# Patient Record
Sex: FEMALE | Race: BLACK OR AFRICAN AMERICAN | NOT HISPANIC OR LATINO | URBAN - METROPOLITAN AREA
[De-identification: names, ages, dates, MRNs, and addresses within clinical notes are randomized per-mention and may not be internally consistent; named-entity substitution may affect disease eponyms.]

---

## 2018-11-17 ENCOUNTER — HOSPITAL ENCOUNTER (OUTPATIENT)
Dept: MRI IMAGING | Facility: HOSPITAL | Age: 38
Discharge: HOME | End: 2018-11-17
Attending: ORTHOPAEDIC SURGERY | Admitting: ORTHOPAEDIC SURGERY

## 2023-06-15 ENCOUNTER — PREP FOR SURGERY (OUTPATIENT)
Dept: OTHER | Facility: HOSPITAL | Age: 43
End: 2023-06-15
Payer: MEDICAID

## 2023-06-15 ENCOUNTER — OFFICE VISIT (OUTPATIENT)
Dept: SURGERY | Facility: CLINIC | Age: 43
End: 2023-06-15
Payer: MEDICAID

## 2023-06-15 VITALS
OXYGEN SATURATION: 100 % | HEIGHT: 64 IN | DIASTOLIC BLOOD PRESSURE: 78 MMHG | TEMPERATURE: 98.2 F | BODY MASS INDEX: 22.31 KG/M2 | SYSTOLIC BLOOD PRESSURE: 116 MMHG | HEART RATE: 74 BPM

## 2023-06-15 DIAGNOSIS — L05.91 PILONIDAL CYST: Primary | ICD-10-CM

## 2023-06-15 RX ORDER — AMLODIPINE BESYLATE 5 MG/1
5 TABLET ORAL DAILY
COMMUNITY

## 2023-06-15 RX ORDER — BACLOFEN 10 MG/1
TABLET ORAL
COMMUNITY
Start: 2023-06-01

## 2023-06-15 RX ORDER — IBUPROFEN 600 MG/1
TABLET ORAL
COMMUNITY
Start: 2023-06-01

## 2023-06-15 RX ORDER — LISINOPRIL AND HYDROCHLOROTHIAZIDE 25; 20 MG/1; MG/1
1 TABLET ORAL DAILY
COMMUNITY

## 2023-06-15 NOTE — PROGRESS NOTES
"Chief Complaint  New Patient (Pilonidal Cyst)    Subjective        Kathi Mason presents to [facility] GENERAL SURGERY  History of Present Illness    The patient is a 42-year-old female with a history of cerebral palsy. She also has a history of hidradenitis of bilateral axilla with excision and skin grafting when she was 14 years old. She has no other significant past medical or surgical history.     She presents today to discuss her pilonidal cyst. She has had this for many years, however, over the past couple of years, it has worsened. The cyst was incised and drained at the office of her primary care clinician, and she states that the cyst returned almost immediately. The cyst causes her pain daily. She spends much of her time sitting in her wheelchair which puts added pressure on this area. She denies fevers, chills, or drainage. She denies prior surgical intervention for this issue.     Objective   Vital Signs:  /78 (Cuff Size: Adult)   Pulse 74   Temp 98.2 °F (36.8 °C) (Infrared)   Ht 162.6 cm (64\")   SpO2 100%   BMI 22.31 kg/m²   Estimated body mass index is 22.31 kg/m² as calculated from the following:    Height as of this encounter: 162.6 cm (64\").    Weight as of 2/6/19: 59 kg (130 lb).             Physical Exam     Constitutional:       General: She is not in acute distress.     Appearance: Normal appearance. She is not ill-appearing.   HENT:      Head: Normocephalic and atraumatic.      Right Ear: External ear normal.      Left Ear: External ear normal.   Eyes:      Extraocular Movements: Extraocular movements intact.      Conjunctiva/sclera: Conjunctivae normal.   Cardiovascular:      Rate and Rhythm: Normal rate and regular rhythm.   Pulmonary:      Effort: Pulmonary effort is normal. No respiratory distress.   Abdominal:      General: There is no distension.      Palpations: Abdomen is soft.      Tenderness: There is no abdominal tenderness.   Musculoskeletal:         " General: No swelling or deformity.   Skin:     General: Skin is warm and dry.      Comment: Multiple draining pits over the sacrum. No evidence of infection.  Neurological:      Mental Status: She is alert and oriented to person, place, and time. Mental status is at baseline.    Result Review :                   Assessment and Plan   There are no diagnoses linked to this encounter.        Pilonidal cyst:  The patient is a 42-year-old female with history of cerebral palsy and hidradenitis. She now has a pilonidal cyst causing her pain. The plan is for a pilonidal cystectomy in the operating room. I discussed risks, benefits, and alternatives to pilonidal cyst excision including infection, bleeding, and injury to surrounding structures, and the patient elects to proceed.       Follow Up   No follow-ups on file.  Patient was given instructions and counseling regarding her condition or for health maintenance advice. Please see specific information pulled into the AVS if appropriate.     Transcribed from ambient dictation for Gómez Espino MD by Jie Washington.  06/15/23   12:27 EDT    Patient or patient representative verbalized consent to the visit recording.  I have personally performed the services described in this document as transcribed by the above individual, and it is both accurate and complete.

## 2023-07-27 ENCOUNTER — OFFICE VISIT (OUTPATIENT)
Dept: SURGERY | Facility: CLINIC | Age: 43
End: 2023-07-27
Payer: MEDICAID

## 2023-07-27 VITALS
DIASTOLIC BLOOD PRESSURE: 97 MMHG | HEIGHT: 64 IN | SYSTOLIC BLOOD PRESSURE: 146 MMHG | OXYGEN SATURATION: 100 % | HEART RATE: 78 BPM | TEMPERATURE: 99.1 F | BODY MASS INDEX: 21.87 KG/M2

## 2023-07-27 DIAGNOSIS — L05.91 PILONIDAL CYST: Primary | ICD-10-CM

## 2023-07-27 PROCEDURE — 1160F RVW MEDS BY RX/DR IN RCRD: CPT | Performed by: STUDENT IN AN ORGANIZED HEALTH CARE EDUCATION/TRAINING PROGRAM

## 2023-07-27 PROCEDURE — 1159F MED LIST DOCD IN RCRD: CPT | Performed by: STUDENT IN AN ORGANIZED HEALTH CARE EDUCATION/TRAINING PROGRAM

## 2023-07-27 PROCEDURE — 99024 POSTOP FOLLOW-UP VISIT: CPT | Performed by: STUDENT IN AN ORGANIZED HEALTH CARE EDUCATION/TRAINING PROGRAM

## 2023-08-19 NOTE — PROGRESS NOTES
"Chief Complaint  Post-op (PO Pilonidal Cystectomy 7/14/23)    Subjective        Kathi Mason presents to [facility] GENERAL SURGERY  History of Present Illness    43-year-old lady here for follow-up after her pilonidal cystectomy.  She is doing well.  I removed her sutures and drain today.  Follow-up in 1 month to discuss progress.    Objective   Vital Signs:  /97 (Cuff Size: Adult) Comment: Patient in pain  Pulse 78   Temp 99.1 øF (37.3 øC) (Infrared)   Ht 162.6 cm (64\")   SpO2 100%   BMI 21.87 kg/mý   Estimated body mass index is 21.87 kg/mý as calculated from the following:    Height as of this encounter: 162.6 cm (64\").    Weight as of 7/14/23: 57.8 kg (127 lb 6.4 oz).       BMI is within normal parameters. No other follow-up for BMI required.      Physical Exam   Result Review :                   Assessment and Plan   Diagnoses and all orders for this visit:    1. Pilonidal cyst (Primary)        43-year-old lady here for follow-up after her pilonidal cystectomy.  She is doing well.  I removed her sutures and drain today.  Follow-up in 1 month to discuss progress.         Follow Up   No follow-ups on file.  Patient was given instructions and counseling regarding her condition or for health maintenance advice. Please see specific information pulled into the AVS if appropriate.         "

## 2023-08-29 ENCOUNTER — OFFICE VISIT (OUTPATIENT)
Dept: SURGERY | Facility: CLINIC | Age: 43
End: 2023-08-29
Payer: MEDICAID

## 2023-08-29 VITALS
DIASTOLIC BLOOD PRESSURE: 85 MMHG | TEMPERATURE: 98.7 F | BODY MASS INDEX: 21.87 KG/M2 | HEIGHT: 64 IN | HEART RATE: 92 BPM | OXYGEN SATURATION: 100 % | SYSTOLIC BLOOD PRESSURE: 119 MMHG

## 2023-08-29 DIAGNOSIS — L05.91 PILONIDAL CYST: Primary | ICD-10-CM

## 2023-08-29 PROCEDURE — 99024 POSTOP FOLLOW-UP VISIT: CPT | Performed by: STUDENT IN AN ORGANIZED HEALTH CARE EDUCATION/TRAINING PROGRAM

## 2023-08-29 PROCEDURE — 1159F MED LIST DOCD IN RCRD: CPT | Performed by: STUDENT IN AN ORGANIZED HEALTH CARE EDUCATION/TRAINING PROGRAM

## 2023-08-29 PROCEDURE — 1160F RVW MEDS BY RX/DR IN RCRD: CPT | Performed by: STUDENT IN AN ORGANIZED HEALTH CARE EDUCATION/TRAINING PROGRAM

## 2023-08-29 NOTE — PROGRESS NOTES
"Chief Complaint  Post-op (1 mos fu PO Pilonidal Cystectomy 7/14/23)    Subjective        Kathi Mason presents to [facility] GENERAL SURGERY  History of Present Illness    43-year-old lady here for follow-up after her pilonidal cystectomy.  Still having slight amount of pain with direct pressure to the area however no significant pain.  No fever or chills, no drainage, no open wound.  Incision looks great, no further intervention is needed.  We discussed to offload pressure from the area is much as possible, this is difficult given her cerebral palsy.  No further restrictions my standpoint she can follow-up being in the future if needed.    Objective   Vital Signs:  /85 (Cuff Size: Adult)   Pulse 92   Temp 98.7 øF (37.1 øC) (Infrared)   Ht 162.6 cm (64\")   SpO2 100%   BMI 21.87 kg/mý   Estimated body mass index is 21.87 kg/mý as calculated from the following:    Height as of this encounter: 162.6 cm (64\").    Weight as of 7/14/23: 57.8 kg (127 lb 6.4 oz).       BMI is within normal parameters. No other follow-up for BMI required.      Physical Exam  Constitutional:       General: She is not in acute distress.     Appearance: Normal appearance. She is not ill-appearing.   HENT:      Head: Normocephalic and atraumatic.      Right Ear: External ear normal.      Left Ear: External ear normal.   Eyes:      Extraocular Movements: Extraocular movements intact.      Conjunctiva/sclera: Conjunctivae normal.   Cardiovascular:      Rate and Rhythm: Normal rate and regular rhythm.   Pulmonary:      Effort: Pulmonary effort is normal. No respiratory distress.   Abdominal:      General: There is no distension.      Palpations: Abdomen is soft.      Tenderness: There is no abdominal tenderness.   Musculoskeletal:         General: No swelling or deformity.   Skin:     General: Skin is warm and dry.   Neurological:      Mental Status: She is alert and oriented to person, place, and time. Mental status " is at baseline.      Result Review :                   Assessment and Plan   Diagnoses and all orders for this visit:    1. Pilonidal cyst (Primary)      43-year-old lady here for follow-up after her pilonidal cystectomy.  Still having slight amount of pain with direct pressure to the area however no significant pain.  No fever or chills, no drainage, no open wound.  Incision looks great, no further intervention is needed.  We discussed to offload pressure from the area is much as possible, this is difficult given her cerebral palsy.  No further restrictions my standpoint she can follow-up being in the future if needed.         Follow Up   No follow-ups on file.  Patient was given instructions and counseling regarding her condition or for health maintenance advice. Please see specific information pulled into the AVS if appropriate.

## 2024-02-12 RX ORDER — SULFAMETHOXAZOLE AND TRIMETHOPRIM 800; 160 MG/1; MG/1
1 TABLET ORAL 2 TIMES DAILY
Qty: 20 TABLET | Refills: 0 | Status: SHIPPED | OUTPATIENT
Start: 2024-02-12

## 2024-02-22 ENCOUNTER — TELEPHONE (OUTPATIENT)
Dept: SURGERY | Facility: CLINIC | Age: 44
End: 2024-02-22

## 2024-02-22 ENCOUNTER — OFFICE VISIT (OUTPATIENT)
Dept: SURGERY | Facility: CLINIC | Age: 44
End: 2024-02-22
Payer: MEDICAID

## 2024-02-22 VITALS
HEART RATE: 86 BPM | OXYGEN SATURATION: 99 % | SYSTOLIC BLOOD PRESSURE: 131 MMHG | BODY MASS INDEX: 21.87 KG/M2 | TEMPERATURE: 98.4 F | HEIGHT: 64 IN | DIASTOLIC BLOOD PRESSURE: 94 MMHG

## 2024-02-22 DIAGNOSIS — L05.91 PILONIDAL CYST: Primary | ICD-10-CM

## 2024-02-22 RX ORDER — SODIUM CHLORIDE 9 MG/ML
40 INJECTION, SOLUTION INTRAVENOUS AS NEEDED
OUTPATIENT
Start: 2024-02-22

## 2024-02-22 RX ORDER — SODIUM CHLORIDE 0.9 % (FLUSH) 0.9 %
3 SYRINGE (ML) INJECTION EVERY 12 HOURS SCHEDULED
OUTPATIENT
Start: 2024-02-22

## 2024-02-22 RX ORDER — SODIUM CHLORIDE 0.9 % (FLUSH) 0.9 %
3-10 SYRINGE (ML) INJECTION AS NEEDED
OUTPATIENT
Start: 2024-02-22

## 2024-02-22 NOTE — TELEPHONE ENCOUNTER
02/22/24 Per [facility] Ted alex.  No auth required for CPT 03189 done I&D Pilondial Abscess done IOP on 02/22/24.

## 2024-02-23 ENCOUNTER — LAB (OUTPATIENT)
Dept: LAB | Facility: HOSPITAL | Age: 44
End: 2024-02-23
Payer: MEDICAID

## 2024-02-23 LAB
ANION GAP SERPL CALCULATED.3IONS-SCNC: 10 MMOL/L (ref 5–15)
BASOPHILS # BLD AUTO: 0.07 10*3/MM3 (ref 0–0.2)
BASOPHILS NFR BLD AUTO: 1.2 % (ref 0–1.5)
BUN SERPL-MCNC: 10 MG/DL (ref 6–20)
BUN/CREAT SERPL: 18.2 (ref 7–25)
CALCIUM SPEC-SCNC: 9 MG/DL (ref 8.6–10.5)
CHLORIDE SERPL-SCNC: 103 MMOL/L (ref 98–107)
CO2 SERPL-SCNC: 26 MMOL/L (ref 22–29)
CREAT SERPL-MCNC: 0.55 MG/DL (ref 0.57–1)
DEPRECATED RDW RBC AUTO: 42.9 FL (ref 37–54)
EGFRCR SERPLBLD CKD-EPI 2021: 118.3 ML/MIN/1.73
EOSINOPHIL # BLD AUTO: 0.15 10*3/MM3 (ref 0–0.4)
EOSINOPHIL NFR BLD AUTO: 2.6 % (ref 0.3–6.2)
ERYTHROCYTE [DISTWIDTH] IN BLOOD BY AUTOMATED COUNT: 12.8 % (ref 12.3–15.4)
GLUCOSE SERPL-MCNC: 71 MG/DL (ref 65–99)
HCT VFR BLD AUTO: 37.2 % (ref 34–46.6)
HGB BLD-MCNC: 12.6 G/DL (ref 12–15.9)
IMM GRANULOCYTES # BLD AUTO: 0.02 10*3/MM3 (ref 0–0.05)
IMM GRANULOCYTES NFR BLD AUTO: 0.3 % (ref 0–0.5)
LYMPHOCYTES # BLD AUTO: 1.76 10*3/MM3 (ref 0.7–3.1)
LYMPHOCYTES NFR BLD AUTO: 30.6 % (ref 19.6–45.3)
MCH RBC QN AUTO: 31.7 PG (ref 26.6–33)
MCHC RBC AUTO-ENTMCNC: 33.9 G/DL (ref 31.5–35.7)
MCV RBC AUTO: 93.5 FL (ref 79–97)
MONOCYTES # BLD AUTO: 0.47 10*3/MM3 (ref 0.1–0.9)
MONOCYTES NFR BLD AUTO: 8.2 % (ref 5–12)
NEUTROPHILS NFR BLD AUTO: 3.29 10*3/MM3 (ref 1.7–7)
NEUTROPHILS NFR BLD AUTO: 57.1 % (ref 42.7–76)
NRBC BLD AUTO-RTO: 0 /100 WBC (ref 0–0.2)
PLATELET # BLD AUTO: 412 10*3/MM3 (ref 140–450)
PMV BLD AUTO: 9.5 FL (ref 6–12)
POTASSIUM SERPL-SCNC: 3.4 MMOL/L (ref 3.5–5.2)
RBC # BLD AUTO: 3.98 10*6/MM3 (ref 3.77–5.28)
SODIUM SERPL-SCNC: 139 MMOL/L (ref 136–145)
WBC NRBC COR # BLD AUTO: 5.76 10*3/MM3 (ref 3.4–10.8)

## 2024-02-23 PROCEDURE — 80048 BASIC METABOLIC PNL TOTAL CA: CPT | Performed by: STUDENT IN AN ORGANIZED HEALTH CARE EDUCATION/TRAINING PROGRAM

## 2024-02-23 PROCEDURE — 36415 COLL VENOUS BLD VENIPUNCTURE: CPT | Performed by: STUDENT IN AN ORGANIZED HEALTH CARE EDUCATION/TRAINING PROGRAM

## 2024-02-23 PROCEDURE — 85025 COMPLETE CBC W/AUTO DIFF WBC: CPT | Performed by: STUDENT IN AN ORGANIZED HEALTH CARE EDUCATION/TRAINING PROGRAM

## 2024-02-27 ENCOUNTER — ANESTHESIA (OUTPATIENT)
Dept: PERIOP | Facility: HOSPITAL | Age: 44
End: 2024-02-27
Payer: MEDICAID

## 2024-02-27 ENCOUNTER — ANESTHESIA EVENT (OUTPATIENT)
Dept: PERIOP | Facility: HOSPITAL | Age: 44
End: 2024-02-27
Payer: MEDICAID

## 2024-02-27 ENCOUNTER — HOSPITAL ENCOUNTER (OUTPATIENT)
Facility: HOSPITAL | Age: 44
Setting detail: HOSPITAL OUTPATIENT SURGERY
Discharge: HOME | End: 2024-02-27
Attending: STUDENT IN AN ORGANIZED HEALTH CARE EDUCATION/TRAINING PROGRAM | Admitting: STUDENT IN AN ORGANIZED HEALTH CARE EDUCATION/TRAINING PROGRAM
Payer: MEDICAID

## 2024-02-27 VITALS
HEART RATE: 70 BPM | DIASTOLIC BLOOD PRESSURE: 110 MMHG | TEMPERATURE: 97.5 F | HEIGHT: 64 IN | OXYGEN SATURATION: 100 % | BODY MASS INDEX: 22.36 KG/M2 | RESPIRATION RATE: 15 BRPM | WEIGHT: 131 LBS | SYSTOLIC BLOOD PRESSURE: 143 MMHG

## 2024-02-27 DIAGNOSIS — L05.91 PILONIDAL CYST: ICD-10-CM

## 2024-02-27 LAB — B-HCG UR QL: NEGATIVE

## 2024-02-27 PROCEDURE — 25010000002 DEXAMETHASONE PER 1 MG: Performed by: NURSE ANESTHETIST, CERTIFIED REGISTERED

## 2024-02-27 PROCEDURE — 25010000002 FENTANYL CITRATE (PF) 50 MCG/ML SOLUTION: Performed by: NURSE ANESTHETIST, CERTIFIED REGISTERED

## 2024-02-27 PROCEDURE — 25010000002 MAGNESIUM SULFATE PER 500 MG OF MAGNESIUM: Performed by: NURSE ANESTHETIST, CERTIFIED REGISTERED

## 2024-02-27 PROCEDURE — 25010000002 SUGAMMADEX 200 MG/2ML SOLUTION: Performed by: NURSE ANESTHETIST, CERTIFIED REGISTERED

## 2024-02-27 PROCEDURE — 25810000003 LACTATED RINGERS PER 1000 ML: Performed by: STUDENT IN AN ORGANIZED HEALTH CARE EDUCATION/TRAINING PROGRAM

## 2024-02-27 PROCEDURE — 25010000002 HYDROMORPHONE 1 MG/ML SOLUTION: Performed by: NURSE ANESTHETIST, CERTIFIED REGISTERED

## 2024-02-27 PROCEDURE — 25010000002 PROPOFOL 10 MG/ML EMULSION: Performed by: NURSE ANESTHETIST, CERTIFIED REGISTERED

## 2024-02-27 PROCEDURE — 81025 URINE PREGNANCY TEST: CPT | Performed by: STUDENT IN AN ORGANIZED HEALTH CARE EDUCATION/TRAINING PROGRAM

## 2024-02-27 PROCEDURE — 88304 TISSUE EXAM BY PATHOLOGIST: CPT | Performed by: STUDENT IN AN ORGANIZED HEALTH CARE EDUCATION/TRAINING PROGRAM

## 2024-02-27 PROCEDURE — 25010000002 MIDAZOLAM PER 1 MG: Performed by: NURSE ANESTHETIST, CERTIFIED REGISTERED

## 2024-02-27 PROCEDURE — 25010000002 ONDANSETRON PER 1 MG: Performed by: NURSE ANESTHETIST, CERTIFIED REGISTERED

## 2024-02-27 PROCEDURE — 25010000002 LABETALOL 5 MG/ML SOLUTION: Performed by: NURSE ANESTHETIST, CERTIFIED REGISTERED

## 2024-02-27 RX ORDER — ROCURONIUM BROMIDE 10 MG/ML
INJECTION, SOLUTION INTRAVENOUS AS NEEDED
Status: DISCONTINUED | OUTPATIENT
Start: 2024-02-27 | End: 2024-02-27 | Stop reason: SURG

## 2024-02-27 RX ORDER — DIPHENHYDRAMINE HYDROCHLORIDE 50 MG/ML
12.5 INJECTION INTRAMUSCULAR; INTRAVENOUS ONCE AS NEEDED
Status: DISCONTINUED | OUTPATIENT
Start: 2024-02-27 | End: 2024-02-27 | Stop reason: HOSPADM

## 2024-02-27 RX ORDER — MIDAZOLAM HYDROCHLORIDE 1 MG/ML
INJECTION INTRAMUSCULAR; INTRAVENOUS AS NEEDED
Status: DISCONTINUED | OUTPATIENT
Start: 2024-02-27 | End: 2024-02-27 | Stop reason: SURG

## 2024-02-27 RX ORDER — IPRATROPIUM BROMIDE AND ALBUTEROL SULFATE 2.5; .5 MG/3ML; MG/3ML
3 SOLUTION RESPIRATORY (INHALATION) ONCE AS NEEDED
Status: DISCONTINUED | OUTPATIENT
Start: 2024-02-27 | End: 2024-02-27 | Stop reason: HOSPADM

## 2024-02-27 RX ORDER — MAGNESIUM SULFATE HEPTAHYDRATE 500 MG/ML
INJECTION, SOLUTION INTRAMUSCULAR; INTRAVENOUS AS NEEDED
Status: DISCONTINUED | OUTPATIENT
Start: 2024-02-27 | End: 2024-02-27 | Stop reason: SURG

## 2024-02-27 RX ORDER — SODIUM CHLORIDE 0.9 % (FLUSH) 0.9 %
3 SYRINGE (ML) INJECTION EVERY 12 HOURS SCHEDULED
Status: DISCONTINUED | OUTPATIENT
Start: 2024-02-27 | End: 2024-02-27 | Stop reason: HOSPADM

## 2024-02-27 RX ORDER — FENTANYL CITRATE 50 UG/ML
INJECTION, SOLUTION INTRAMUSCULAR; INTRAVENOUS AS NEEDED
Status: DISCONTINUED | OUTPATIENT
Start: 2024-02-27 | End: 2024-02-27 | Stop reason: SURG

## 2024-02-27 RX ORDER — DIPHENHYDRAMINE HYDROCHLORIDE 50 MG/ML
12.5 INJECTION INTRAMUSCULAR; INTRAVENOUS
Status: DISCONTINUED | OUTPATIENT
Start: 2024-02-27 | End: 2024-02-27 | Stop reason: HOSPADM

## 2024-02-27 RX ORDER — LIDOCAINE HYDROCHLORIDE 10 MG/ML
0.5 INJECTION, SOLUTION INFILTRATION; PERINEURAL ONCE AS NEEDED
Status: DISCONTINUED | OUTPATIENT
Start: 2024-02-27 | End: 2024-02-27 | Stop reason: HOSPADM

## 2024-02-27 RX ORDER — ACETAMINOPHEN 160 MG
TABLET,DISINTEGRATING ORAL AS NEEDED
Status: DISCONTINUED | OUTPATIENT
Start: 2024-02-27 | End: 2024-02-27 | Stop reason: HOSPADM

## 2024-02-27 RX ORDER — SODIUM CHLORIDE, SODIUM LACTATE, POTASSIUM CHLORIDE, CALCIUM CHLORIDE 600; 310; 30; 20 MG/100ML; MG/100ML; MG/100ML; MG/100ML
1000 INJECTION, SOLUTION INTRAVENOUS CONTINUOUS
Status: DISCONTINUED | OUTPATIENT
Start: 2024-02-27 | End: 2024-02-27 | Stop reason: HOSPADM

## 2024-02-27 RX ORDER — SODIUM CHLORIDE 9 MG/ML
40 INJECTION, SOLUTION INTRAVENOUS AS NEEDED
Status: DISCONTINUED | OUTPATIENT
Start: 2024-02-27 | End: 2024-02-27 | Stop reason: HOSPADM

## 2024-02-27 RX ORDER — OXYCODONE HYDROCHLORIDE 5 MG/1
5 TABLET ORAL ONCE AS NEEDED
Status: DISCONTINUED | OUTPATIENT
Start: 2024-02-27 | End: 2024-02-27 | Stop reason: HOSPADM

## 2024-02-27 RX ORDER — EPHEDRINE SULFATE 5 MG/ML
5 INJECTION INTRAVENOUS ONCE AS NEEDED
Status: DISCONTINUED | OUTPATIENT
Start: 2024-02-27 | End: 2024-02-27 | Stop reason: HOSPADM

## 2024-02-27 RX ORDER — NALOXONE HCL 0.4 MG/ML
0.4 VIAL (ML) INJECTION AS NEEDED
Status: DISCONTINUED | OUTPATIENT
Start: 2024-02-27 | End: 2024-02-27 | Stop reason: HOSPADM

## 2024-02-27 RX ORDER — HYDRALAZINE HYDROCHLORIDE 20 MG/ML
5 INJECTION INTRAMUSCULAR; INTRAVENOUS
Status: DISCONTINUED | OUTPATIENT
Start: 2024-02-27 | End: 2024-02-27 | Stop reason: HOSPADM

## 2024-02-27 RX ORDER — OXYCODONE HYDROCHLORIDE 5 MG/1
5 TABLET ORAL EVERY 8 HOURS PRN
Qty: 40 TABLET | Refills: 0 | Status: SHIPPED | OUTPATIENT
Start: 2024-02-27 | End: 2024-04-07

## 2024-02-27 RX ORDER — FLUMAZENIL 0.1 MG/ML
0.2 INJECTION INTRAVENOUS AS NEEDED
Status: DISCONTINUED | OUTPATIENT
Start: 2024-02-27 | End: 2024-02-27 | Stop reason: HOSPADM

## 2024-02-27 RX ORDER — LABETALOL HYDROCHLORIDE 5 MG/ML
5 INJECTION, SOLUTION INTRAVENOUS
Status: DISCONTINUED | OUTPATIENT
Start: 2024-02-27 | End: 2024-02-27 | Stop reason: HOSPADM

## 2024-02-27 RX ORDER — PROPOFOL 10 MG/ML
VIAL (ML) INTRAVENOUS AS NEEDED
Status: DISCONTINUED | OUTPATIENT
Start: 2024-02-27 | End: 2024-02-27 | Stop reason: SURG

## 2024-02-27 RX ORDER — SODIUM CHLORIDE 0.9 % (FLUSH) 0.9 %
10 SYRINGE (ML) INJECTION AS NEEDED
Status: DISCONTINUED | OUTPATIENT
Start: 2024-02-27 | End: 2024-02-27 | Stop reason: HOSPADM

## 2024-02-27 RX ORDER — ONDANSETRON 2 MG/ML
INJECTION INTRAMUSCULAR; INTRAVENOUS AS NEEDED
Status: DISCONTINUED | OUTPATIENT
Start: 2024-02-27 | End: 2024-02-27 | Stop reason: SURG

## 2024-02-27 RX ORDER — SODIUM CHLORIDE 0.9 % (FLUSH) 0.9 %
3-10 SYRINGE (ML) INJECTION AS NEEDED
Status: DISCONTINUED | OUTPATIENT
Start: 2024-02-27 | End: 2024-02-27 | Stop reason: HOSPADM

## 2024-02-27 RX ORDER — LIDOCAINE HYDROCHLORIDE 20 MG/ML
INJECTION, SOLUTION EPIDURAL; INFILTRATION; INTRACAUDAL; PERINEURAL AS NEEDED
Status: DISCONTINUED | OUTPATIENT
Start: 2024-02-27 | End: 2024-02-27 | Stop reason: SURG

## 2024-02-27 RX ORDER — OXYCODONE HYDROCHLORIDE 5 MG/1
10 TABLET ORAL EVERY 4 HOURS PRN
Status: DISCONTINUED | OUTPATIENT
Start: 2024-02-27 | End: 2024-02-27 | Stop reason: HOSPADM

## 2024-02-27 RX ORDER — ONDANSETRON 2 MG/ML
4 INJECTION INTRAMUSCULAR; INTRAVENOUS ONCE AS NEEDED
Status: DISCONTINUED | OUTPATIENT
Start: 2024-02-27 | End: 2024-02-27 | Stop reason: HOSPADM

## 2024-02-27 RX ORDER — MEPERIDINE HYDROCHLORIDE 25 MG/ML
12.5 INJECTION INTRAMUSCULAR; INTRAVENOUS; SUBCUTANEOUS
Status: DISCONTINUED | OUTPATIENT
Start: 2024-02-27 | End: 2024-02-27 | Stop reason: HOSPADM

## 2024-02-27 RX ORDER — DEXAMETHASONE SODIUM PHOSPHATE 4 MG/ML
INJECTION, SOLUTION INTRA-ARTICULAR; INTRALESIONAL; INTRAMUSCULAR; INTRAVENOUS; SOFT TISSUE AS NEEDED
Status: DISCONTINUED | OUTPATIENT
Start: 2024-02-27 | End: 2024-02-27 | Stop reason: SURG

## 2024-02-27 RX ADMIN — LIDOCAINE HYDROCHLORIDE 60 MG: 20 INJECTION, SOLUTION EPIDURAL; INFILTRATION; INTRACAUDAL; PERINEURAL at 10:31

## 2024-02-27 RX ADMIN — Medication 5 MG: at 12:16

## 2024-02-27 RX ADMIN — MIDAZOLAM 1 MG: 1 INJECTION INTRAMUSCULAR; INTRAVENOUS at 10:27

## 2024-02-27 RX ADMIN — MAGNESIUM SULFATE HEPTAHYDRATE 1 G: 500 INJECTION, SOLUTION INTRAMUSCULAR; INTRAVENOUS at 10:45

## 2024-02-27 RX ADMIN — FENTANYL CITRATE 50 MCG: 50 INJECTION, SOLUTION INTRAMUSCULAR; INTRAVENOUS at 10:27

## 2024-02-27 RX ADMIN — SUGAMMADEX 200 MG: 100 INJECTION, SOLUTION INTRAVENOUS at 11:13

## 2024-02-27 RX ADMIN — HYDROMORPHONE HYDROCHLORIDE 0.5 MG: 1 INJECTION, SOLUTION INTRAMUSCULAR; INTRAVENOUS; SUBCUTANEOUS at 12:05

## 2024-02-27 RX ADMIN — ONDANSETRON 4 MG: 2 INJECTION INTRAMUSCULAR; INTRAVENOUS at 11:04

## 2024-02-27 RX ADMIN — PROPOFOL 150 MG: 10 INJECTION, EMULSION INTRAVENOUS at 10:31

## 2024-02-27 RX ADMIN — PROPOFOL 175 MCG/KG/MIN: 10 INJECTION, EMULSION INTRAVENOUS at 10:37

## 2024-02-27 RX ADMIN — MIDAZOLAM 1 MG: 1 INJECTION INTRAMUSCULAR; INTRAVENOUS at 10:33

## 2024-02-27 RX ADMIN — ROCURONIUM BROMIDE 10 MG: 10 INJECTION, SOLUTION INTRAVENOUS at 10:50

## 2024-02-27 RX ADMIN — DEXAMETHASONE SODIUM PHOSPHATE 8 MG: 4 INJECTION, SOLUTION INTRAMUSCULAR; INTRAVENOUS at 11:04

## 2024-02-27 RX ADMIN — ROCURONIUM BROMIDE 30 MG: 10 INJECTION, SOLUTION INTRAVENOUS at 10:31

## 2024-02-27 RX ADMIN — FENTANYL CITRATE 50 MCG: 50 INJECTION, SOLUTION INTRAMUSCULAR; INTRAVENOUS at 10:33

## 2024-02-27 RX ADMIN — SODIUM CHLORIDE, SODIUM LACTATE, POTASSIUM CHLORIDE, AND CALCIUM CHLORIDE 1000 ML: .6; .31; .03; .02 INJECTION, SOLUTION INTRAVENOUS at 07:38

## 2024-02-27 RX ADMIN — OXYCODONE 10 MG: 5 TABLET ORAL at 12:07

## 2024-02-27 NOTE — PRE-PROCEDURE NOTE
No antibiotics ordered by Dr. Reid pre-op.  Secure chat sent to Dr. Reid who states no need for IV antibiotics.

## 2024-02-27 NOTE — ANESTHESIA POSTPROCEDURE EVALUATION
Patient: Kathi Mason    Procedure Summary       Date: 02/27/24 Room / Location: [facility] OR 06 / [facility] MAIN OR    Anesthesia Start: 1027 Anesthesia Stop: 1130    Procedure: PILONIDAL CYSTECTOMY (Back) Diagnosis:       Pilonidal cyst      (Pilonidal cyst [L05.91])    Surgeons: Brain Reid MD Provider: Luisito Baca MD    Anesthesia Type: general ASA Status: 3            Anesthesia Type: general    Vitals  Vitals Value Taken Time   /105 02/27/24 1224   Temp 97.5 °F (36.4 °C) 02/27/24 1224   Pulse 74 02/27/24 1226   Resp 12 02/27/24 1224   SpO2 99 % 02/27/24 1226   Vitals shown include unfiled device data.        Post Anesthesia Care and Evaluation    Patient location during evaluation: PACU  Patient participation: complete - patient participated  Level of consciousness: awake  Pain scale: See nurse's notes for pain score.  Pain management: adequate    Airway patency: patent  Anesthetic complications: No anesthetic complications  PONV Status: none  Cardiovascular status: acceptable  Respiratory status: acceptable and spontaneous ventilation  Hydration status: acceptable    Comments: Patient seen and examined postoperatively; vital signs stable; SpO2 greater than or equal to 90%; cardiopulmonary status stable; nausea/vomiting adequately controlled; pain adequately controlled; no apparent anesthesia complications; patient discharged from anesthesia care when discharge criteria were met

## 2024-02-27 NOTE — ANESTHESIA PREPROCEDURE EVALUATION
Anesthesia Evaluation     Patient summary reviewed and Nursing notes reviewed   history of anesthetic complications:  PONV  NPO Solid Status: > 8 hours  NPO Liquid Status: > 2 hours           Airway   Mallampati: II  TM distance: >3 FB  Neck ROM: full  No difficulty expected  Dental    (+) edentulous    Pulmonary - normal exam   (+) a smoker Current,  Cardiovascular - normal exam    (+) hypertension      Neuro/Psych  GI/Hepatic/Renal/Endo      Musculoskeletal     Abdominal  - normal exam   Substance History      OB/GYN          Other        ROS/Med Hx Other: Additional History:  Hypokalemia, pilonidal cyst, CP,     PSH:  EYE SURGERY AXILLARY LYMPH NODE BIOPSY/EXCISION  ANKLE SURGERY HIP SURGERY  LEG SURGERY                   Anesthesia Plan    ASA 3     general     (Patient identified; pre-operative vital signs, all relevant labs/studies, complete medical/surgical/anesthetic history, full medication list, full allergy list, and NPO status obtained/reviewed; physical assessment performed; anesthetic options, side effects, potential complications, risks, and benefits discussed; questions answered; written anesthesia consent obtained; patient cleared for procedure; anesthesia machine and equipment checked and functioning)  intravenous induction     Anesthetic plan, risks, benefits, and alternatives have been provided, discussed and informed consent has been obtained with: patient.    Plan discussed with CRNA and CAA.      CODE STATUS:

## 2024-02-27 NOTE — PRE-PROCEDURE NOTE
Order for repeat potassium per PAT.  Potassium level 3.4 in pre-ops.  Dr. Baca, anesthesiologist, states no need to repeat DOS.

## 2024-02-27 NOTE — DISCHARGE INSTRUCTIONS
[facility]  Postoperative Instructions after Anorectal Surgery    After your surgery, the following may occur and SHOULD NOT alarm you:   a) Drainage of bloody discharge (a tablespoonful or less)   b) Some swelling around the anus   c) Soreness, burning, itching, or dull aching   d) Pain with bowel movement   e) Occasional passage of bright red blood   f) Mild fatigue   g) Mild fever (less than 101.4)   h) odor.    Diet:   1. Eat a regular high fiber diet with a considerable amount of cooked vegetables, fruits and fruit juices.  2. Avoid spicy foods.  3. Drink greater than 64 ounces of water or liquids without caffeine daily.    Wound Care:  1. Use ice packs for the first two days following surgery if you feel that it helps your discomfort.  2. Take warm soaks/sitzbaths/showers 1-2 times a day, water as warm as can be tolerated.  3. Expect bleeding/drainage with bowel movements, so wear pads to protect underwear.    Pain Meds:  1. Motrin 600mg every 6 hours as needed  2. Narcotic pain medication as prescribed  3. If nauseated or vomiting, take phenergan or zofran as prescribed (call my office for a prescription).  Try to stay hydrated.  If nausea persists, you need to be seen in the office.   4. Do not drive while taking narcotics    Constipated:   1. To avoid constipation:   A. Take Fiber (example: metamucil) 4 grams daily   B. Miralax 17 grams nightly as needed   C. Milk of Magnesia 2 tablespoons am/pm as needed  2. If bowel movements become too loose, stop MOM but stay on fiber supplements.    No Bowel Movements:  1. If unable to have a bowel movement in 36-48 hours:   A. Take 3 Dulcolax laxative tablets   B. If no bowel movement within 4-6 hours after Dulcolax, take 5 ounces of Magnesium Citrate    If Unable to Urinate:  1. Try to urinate in a hot shower or bath.   2. If still unable to urinate, go to the Emergency Room to have a catheter placed.  The doctor in the office will remove  this catheter in a few days.     When to Call Us:  1. Fever higher than 101.5  2. Persistent nausea and/or vomiting  3. Excessive bleeding (bloody diarrhea).  It is normal to pass some blood with bowel movements but passing a cup of blood at a time is abnormal.  4. Increasing pain not relieved with above instructions.     Follow up:   Generally in 2-3 weeks unless otherwise directed.  Please call to make an appointment.         Brain Reid MD  Colon and Rectal Surgery   [facility]

## 2024-02-27 NOTE — ANESTHESIA PROCEDURE NOTES
Airway  Urgency: elective    Date/Time: 2/27/2024 10:33 AM  Airway not difficult    General Information and Staff    Patient location during procedure: OR    Indications and Patient Condition  Indications for airway management: airway protection    Preoxygenated: yes  MILS not maintained throughout  Mask difficulty assessment: 1 - vent by mask    Final Airway Details  Final airway type: endotracheal airway      Successful airway: ETT  Cuffed: yes   Successful intubation technique: video laryngoscopy  Facilitating devices/methods: intubating stylet  Endotracheal tube insertion site: oral  Blade: Wright  Blade size: 3  ETT size (mm): 7.0  Cormack-Lehane Classification: grade I - full view of glottis  Placement verified by: chest auscultation and capnometry   Measured from: lips  ETT/EBT  to lips (cm): 20  Number of attempts at approach: 1  Assessment: lips, teeth, and gum same as pre-op and atraumatic intubation

## 2024-02-27 NOTE — H&P
[facility]   HISTORY AND PHYSICAL    Patient Name: Kathi Mason  : 1980  MRN: [id number]  Primary Care Physician:  Allie Huertas APRN  Date of admission: 2024    Subjective   Subjective     Chief Complaint: Recurrent pilonidal disease     History of Present Illness  42 yo F s/p pilonidal cyst resection with recurrence and purulent drainage.     Review of Systems     Personal History     Past Medical History:   Diagnosis Date    Cerebral palsy     Hypertension     PONV (postoperative nausea and vomiting)        Past Surgical History:   Procedure Laterality Date    ANKLE SURGERY      AXILLARY LYMPH NODE BIOPSY/EXCISION      EYE SURGERY      HIP SURGERY      LEG SURGERY Bilateral     MASS EXCISION N/A 2023    Procedure: PILONIDAL CYSTECTOMY, patient prone;  Surgeon: Gómez Espino MD;  Location: [facility];  Service: General;  Laterality: N/A;       Family History: family history includes Hypertension in her mother. Otherwise pertinent FHx was reviewed and not pertinent to current issue.    Social History:  reports that she has been smoking cigarettes. She has been smoking an average of .5 packs per day. She has been exposed to tobacco smoke. She has never used smokeless tobacco. She reports that she does not currently use alcohol. She reports that she does not currently use drugs.    Home Medications:  amLODIPine, baclofen, ibuprofen, lisinopril-hydrochlorothiazide, and sulfamethoxazole-trimethoprim    Allergies:  Allergies   Allergen Reactions    Latex Unknown - High Severity       Objective    Objective     Vitals:        Physical Exam: indurated pilonidal cyst with erythema.     Result Review    Result Review:  I have personally reviewed the results from the time of this admission to 2024 07:27 EST and agree with these findings:  []  Laboratory list / accordion  []  Microbiology  []  Radiology  []  EKG/Telemetry   []  Cardiology/Vascular   []  Pathology  []  Old records  []   Other:  Most notable findings include:       Assessment & Plan   Assessment / Plan     Brief Patient Summary:  Kathi Mason is a 43 y.o. female who with recurrent pilonidal disease     Active Hospital Problems:  Active Hospital Problems    Diagnosis     **Pilonidal cyst      Plan:   - OR for pilonidal cystectomy and sinus excision with minimal invasive GIPS procedure.       DVT prophylaxis:  Mechanical DVT prophylaxis orders are present.        CODE STATUS:  Full code        Brain Reid MD

## 2024-02-27 NOTE — DISCHARGE SUMMARY
Date of Admission: 2/27/2024  Date of Discharge:  2/27/2024  Primary Care Physician: Allie Huertas, YVETTE     Discharge Diagnosis:  Active Hospital Problems    Diagnosis  POA    **Pilonidal cyst [L05.91]  Unknown      Resolved Hospital Problems   No resolved problems to display.       Presenting Problem/History of Present Illness:  Pilonidal cyst [L05.91]     Hospital Course:  The patient is a 43 y.o. female who presented with recurrent pilonidal abscess and cyst.     Exam Today:    Procedures Performed:  Procedure(s):  Complex pilonidal cystectomy, fistulotomy with seton placement.        Consults:   Consults       No orders found from 1/29/2024 to 2/28/2024.             Discharge Disposition:  Home or Self Care    Discharge Medications:     Discharge Medications        New Medications        Instructions Start Date   oxyCODONE 5 MG immediate release tablet  Commonly known as: Roxicodone   5 mg, Oral, Every 8 Hours PRN             Continue These Medications        Instructions Start Date   amLODIPine 5 MG tablet  Commonly known as: NORVASC   5 mg, Oral, Daily      baclofen 10 MG tablet  Commonly known as: LIORESAL       ibuprofen 600 MG tablet  Commonly known as: ADVIL,MOTRIN       lisinopril-hydrochlorothiazide 20-25 MG per tablet  Commonly known as: PRINZIDE,ZESTORETIC   1 tablet, Oral, Daily, HOLD 24 hours before surgery       sulfamethoxazole-trimethoprim 800-160 MG per tablet  Commonly known as: Bactrim DS   1 tablet, Oral, 2 Times Daily               Discharge Diet: general     Activity at Discharge: as tolerated     Follow-up Appointments: in four weeks   No future appointments.      Test Results Pending at Discharge:  Pending Labs       Order Current Status    Tissue Pathology Exam Collected (02/27/24 1107)             Brain Reid MD  02/27/24  11:48 EST    Time Spent on Discharge Activities: 10

## 2024-02-28 ENCOUNTER — TELEPHONE (OUTPATIENT)
Age: 44
End: 2024-02-28
Payer: MEDICAID

## 2024-02-28 LAB
LAB AP CASE REPORT: NORMAL
PATH REPORT.FINAL DX SPEC: NORMAL
PATH REPORT.GROSS SPEC: NORMAL

## 2024-02-28 NOTE — TELEPHONE ENCOUNTER
PA request received from pt pharmacy for Oxycodone initiated on CoverMyMeds by pharmacy.    PA completed 02/28/2024 and awaiting determination. PA Case ID# [id number], sent to plan today.

## 2024-02-28 NOTE — TELEPHONE ENCOUNTER
PA Denied. Unable to approve the medication (OXYCODONE HCL Tablet 5MG) due to unmet criteria (1) as outlined in the plan guideline below. Plan guideline (Opioid Overutilization) requires the following to be met prior to consideration of approval: Preferred Short- Acting Opioid Therapy- Short Term Use (must meet all): 1. Must use within the plan limitation maximum of 7-day supply with a subsequent claim(s) not to exceed 7-day supply (for a total of 14 days of therapy) every 45 days; 2. Submitted claim does not exceed 60 morphine milligram equivalent (MME) per day; 3. Must meet utilization edits, if applicable; 4. Absence of denial criteria. Note: If you believe the member has met criteria (1) as described above, please resubmit your request with additional clinically supportive documentation for consideration.    Call placed to pharmacy, s/w pharmacist, asked if patient was able to  the medication. States that it is medicaid and they cannot  since PA was req. Advised PA was denied. States they need to have that faxed to them at [phone]. Denial letter faxed.    S/w Dr. Reid to advise of denial and see if there was any other medication that needed to be sent in. Per Dr. Reid, patient can take tylenol and ibuprofen OTC unless cannot tolerate.

## 2024-02-28 NOTE — OP NOTE
CRS Operative Note   Name: Kathi Mason  : 1980    Date of Surgery: 2024  Pre-op Diagnosis: Recurrent pilonidal disease with abscess   Post-op Diagnosis: same  Procedure:   Minimal invasive cystectomy  Fistulotomy with seton placement     Surgeon: Brain Reid M.D.  Assistants: N/A.  Anesthesia: Local/MAC  IV Fluids: refer to anesthesia record  Estimated Blood Loss: minimal  Drains: seton   Implants: none  Specimen: pilonidal cyst   Findings     Recent abscess cavity in the lower back at the superior border of the gluteal cleft  External fistulous opening in the left lateral position tracking to dep post anal space  No internal anal opening with hydrogen peroxide   Large deep post anal space cavity without any abscess cavity         Operative Procedure     After appropriate consent including risks, benefits and alternatives, the patient was brought into the operating suite, and anesthesia administered.   The patient was placed in the prone position, and all tracey prominences were padded. The patient was then prepped and draped in the usual sterile fashion.     Recent incision and drainage site was identified at the superior portion of the gluteal cleft.  There was additional chronic fistulous opening that was on the left lateral perianal area.  After perianal block examination was performed.  She was noted to have a large deep postanal space cavity with no fluctuant collection.  The fistulous opening in the left lateral was excised.  A fistula probe was introduced.  This was noted to track into the deep postanal space.  A Mckeon retractor was placed.  There is no internal opening noted.  Hydrogen peroxide was used.  Again there was no internal opening noted.  Given largest deep postanal space with this fistulous tract and no internal opening, I made a counterincision just beneath the coccyx.  A Silastic none cutting seton was placed and secured with #0 silk ties.     The recent incision and drainage  site of the pilonidal cyst was then curetted and cleaned well.  For multiple thick soft tissue with calcification.  The area was irrigated with hydroperoxide.  There was no sinus or fistulous tracts noted.     The incision was left open.   Dressings were then applied and the case was concluded.    The patient tolerated the procedure well and was taken to the recovery room in stable fashion.      Counts: Instrument, sponge, and needle counts were reported to be correct prior to closure and at the conclusion of the case.    Disposition  The patient was taken to Recovery Room in good condition.    Complications   No complications

## 2024-03-01 NOTE — PROGRESS NOTES
"Chief Complaint  Follow-up (Pilonidal Cyst Reoccurence) and Abscess    Subjective        Kathi Mason presents to [facility] GENERAL SURGERY  History of Present Illness    43-year-old lady with history of pilonidal cystectomy, has developed recurrent pilonidal cyst.  Has had pain for the past few weeks and some swelling no drainage.  On exam she has a pilonidal abscess.  Drain is in the office today with about 5 cc of purulent discharge.  She was also seen by Dr. Reid, he plans to take her to the operating room for exam under anesthesia and possible pilonidal cystectomy.    Objective   Vital Signs:  /94   Pulse 86   Temp 98.4 °F (36.9 °C) (Infrared)   Ht 162.6 cm (64\")   SpO2 99%   BMI 21.87 kg/m²   Estimated body mass index is 21.87 kg/m² as calculated from the following:    Height as of this encounter: 162.6 cm (64\").    Weight as of 7/14/23: 57.8 kg (127 lb 6.4 oz).       BMI is within normal parameters. No other follow-up for BMI required.      Physical Exam  Constitutional:       General: She is not in acute distress.     Appearance: Normal appearance. She is not ill-appearing.   HENT:      Head: Normocephalic and atraumatic.      Right Ear: External ear normal.      Left Ear: External ear normal.   Eyes:      Extraocular Movements: Extraocular movements intact.      Conjunctiva/sclera: Conjunctivae normal.   Cardiovascular:      Rate and Rhythm: Normal rate and regular rhythm.   Pulmonary:      Effort: Pulmonary effort is normal. No respiratory distress.   Abdominal:      General: There is no distension.      Palpations: Abdomen is soft.      Tenderness: There is no abdominal tenderness.   Musculoskeletal:         General: No swelling or deformity.   Skin:     General: Skin is warm and dry.   Neurological:      Mental Status: She is alert and oriented to person, place, and time. Mental status is at baseline.      Result Review :                     Assessment and Plan "     Diagnoses and all orders for this visit:    1. Pilonidal cyst (Primary)      43-year-old lady with history of pilonidal cystectomy, has developed recurrent pilonidal cyst.  Has had pain for the past few weeks and some swelling no drainage.  On exam she has a pilonidal abscess.  Drain is in the office today with about 5 cc of purulent discharge.  She was also seen by Dr. Reid, he plans to take her to the operating room for exam under anesthesia and possible pilonidal cystectomy.  She can follow-up me again in the future if needed.         Follow Up     No follow-ups on file.  Patient was given instructions and counseling regarding her condition or for health maintenance advice. Please see specific information pulled into the AVS if appropriate.

## 2024-03-04 ENCOUNTER — TELEPHONE (OUTPATIENT)
Age: 44
End: 2024-03-04
Payer: MEDICAID

## 2024-03-04 NOTE — TELEPHONE ENCOUNTER
P/O called made for Pilonidal Cystectomy done on 2/27/2024. Patient reports doing well. States she is having only mild discomfort to the area. Will call back to schedule her follow up appointment after she speaks to her mother who will be driving her.

## 2024-03-20 ENCOUNTER — OFFICE VISIT (OUTPATIENT)
Age: 44
End: 2024-03-20
Payer: MEDICAID

## 2024-03-20 VITALS
TEMPERATURE: 99.3 F | WEIGHT: 131 LBS | DIASTOLIC BLOOD PRESSURE: 83 MMHG | BODY MASS INDEX: 22.36 KG/M2 | OXYGEN SATURATION: 99 % | HEIGHT: 64 IN | HEART RATE: 92 BPM | SYSTOLIC BLOOD PRESSURE: 133 MMHG

## 2024-03-20 DIAGNOSIS — K60.3 FISTULA-IN-ANO: Primary | ICD-10-CM

## 2024-03-20 PROCEDURE — 1159F MED LIST DOCD IN RCRD: CPT | Performed by: STUDENT IN AN ORGANIZED HEALTH CARE EDUCATION/TRAINING PROGRAM

## 2024-03-20 PROCEDURE — 1160F RVW MEDS BY RX/DR IN RCRD: CPT | Performed by: STUDENT IN AN ORGANIZED HEALTH CARE EDUCATION/TRAINING PROGRAM

## 2024-03-20 PROCEDURE — 99024 POSTOP FOLLOW-UP VISIT: CPT | Performed by: STUDENT IN AN ORGANIZED HEALTH CARE EDUCATION/TRAINING PROGRAM

## 2024-03-20 RX ORDER — ANORECTAL OINTMENT 15.7; .44; 24; 20.6 G/100G; G/100G; G/100G; G/100G
1 OINTMENT TOPICAL 2 TIMES DAILY
Qty: 4 PACKET | Refills: 0 | COMMUNITY
Start: 2024-03-20

## 2024-03-20 NOTE — PROGRESS NOTES
Colorectal Surgery Followup Note    ID:  Kathi Mason;   : 1980  DATE OF VISIT: 3/20/2024    Chief Complaint  Post-op (1 MON F/U,  S/P PILONIDAL CYSTECTOMY 24, DRAIN REMOVAL)       Subjective    Kathi is s/p minimal invasive cystectomy for pilonidal disease and seton placement for fistula in ano to the deep post anal space. She is doing well. No pain, Minimal discharge. She reports significant perianal pruritus.     Exam  General:  No acute distress  Head: Normocephalic, atraumatic  Neuro: Alert and oriented      External anorectal exam: cystectomy site well healed. 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 I will keep the seton for another 4 weeks. Possible removal in office vs repeat exam under anesthesia   - continue with fiber supplements   - avoid chemical wipes and excessive cleaning over perianal area. Sample Calmoseptine given   - follow up in 4-6 weeks       Brain Reid MD  Colon and Rectal Surgery   [facility]

## 2024-05-08 ENCOUNTER — OFFICE VISIT (OUTPATIENT)
Age: 44
End: 2024-05-08
Payer: MEDICAID

## 2024-05-08 VITALS
SYSTOLIC BLOOD PRESSURE: 129 MMHG | DIASTOLIC BLOOD PRESSURE: 89 MMHG | TEMPERATURE: 97.7 F | WEIGHT: 127 LBS | OXYGEN SATURATION: 100 % | HEART RATE: 75 BPM | BODY MASS INDEX: 21.68 KG/M2 | HEIGHT: 64 IN

## 2024-05-08 DIAGNOSIS — L73.2 HIDRADENITIS SUPPURATIVA OF MULTIPLE SITES: ICD-10-CM

## 2024-05-08 DIAGNOSIS — L05.91 PILONIDAL CYST: ICD-10-CM

## 2024-05-08 DIAGNOSIS — K60.3 FISTULA-IN-ANO: Primary | ICD-10-CM

## 2024-05-08 PROCEDURE — 99024 POSTOP FOLLOW-UP VISIT: CPT | Performed by: STUDENT IN AN ORGANIZED HEALTH CARE EDUCATION/TRAINING PROGRAM

## 2024-05-20 ENCOUNTER — OFFICE VISIT (OUTPATIENT)
Age: 44
End: 2024-05-20
Payer: MEDICAID

## 2024-05-20 VITALS
HEART RATE: 82 BPM | HEIGHT: 64 IN | TEMPERATURE: 98.3 F | SYSTOLIC BLOOD PRESSURE: 136 MMHG | OXYGEN SATURATION: 100 % | BODY MASS INDEX: 21.34 KG/M2 | DIASTOLIC BLOOD PRESSURE: 102 MMHG | WEIGHT: 125 LBS

## 2024-05-20 DIAGNOSIS — K60.3 FISTULA-IN-ANO: Primary | ICD-10-CM

## 2024-05-20 DIAGNOSIS — L73.2 HIDRADENITIS SUPPURATIVA OF MULTIPLE SITES: ICD-10-CM

## 2024-05-20 DIAGNOSIS — L05.91 PILONIDAL CYST: ICD-10-CM

## 2024-05-20 PROCEDURE — 11770 EXC PILONIDAL CYST SIMPLE: CPT | Performed by: STUDENT IN AN ORGANIZED HEALTH CARE EDUCATION/TRAINING PROGRAM

## 2024-05-20 NOTE — PROGRESS NOTES
Colorectal Surgery Followup Note    ID:  Kathi Mason;   : 1980  DATE OF VISIT: 2024    Chief Complaint  Follow-up (IOP pilonidal cyst drainage )       Marleni Camara is here for office-based pilonidal cystectomy.  She continued to have pain in the gluteal cleft.  Denies any fevers or chills.  Exam  General:  No acute distress  Head: Normocephalic, atraumatic  Neuro: Alert and oriented    Procedure Note  Procedure: Pilonidal cystectomy  Indication: Pilonidal sinus and cyst  Description: Patient was placed in prone position .  The gluteal cleft area was draped and cleaned with chlorhexidine.  A 5 cc of 1% lidocaine used for local anesthetic .  A 3 mm punch biopsy was used to core out the pilonidal sinus track with induration .  The pilonidal cyst was curetted with gauze as well as using hemostat .  The cavity was irrigated with hydrogen peroxide .  There was no purulent collection .  A dressing was applied.  Tolerated the procedure well without any immediate complication  Findings: Small indurated pilonidal cyst and sinus.     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  She tolerated the procedure well.  Discussed in detail postoperative wound care.   -Follow-up with dermatology for hidradenitis care and potential biologic medication treatment.   -I will keep the seton in place for now.   -Follow-up in 6 to 8 weeks.       Brain Reid MD  Colon and Rectal Surgery   [facility]

## 2024-05-22 ENCOUNTER — TELEPHONE (OUTPATIENT)
Age: 44
End: 2024-05-22
Payer: MEDICAID

## 2024-05-22 NOTE — TELEPHONE ENCOUNTER
Provider: DR. JULIEN    Caller: yuko nichols    Relationship to Patient: Mother     Phone Number: [phone]    Reason for Call: OUTGOING DERMATOLOGY REFERRAL    When was the patient last seen: 05/20/24    Notes:  MS. NICHOLS SPOKE WITH THE DERMATOLOGY CENTER ON [address] [facility] AND THEY STILL HAVE NOT RECEIVED THE REFERRAL.     PATIENT PROVIDED [phone] AS A GOOD FAX NUMBER FOR THE DERMATOLOGY CENTER.

## 2024-07-11 ENCOUNTER — ANESTHESIA (OUTPATIENT)
Dept: PERIOP | Facility: HOSPITAL | Age: 44
End: 2024-07-11
Payer: MEDICAID

## 2024-07-11 ENCOUNTER — APPOINTMENT (OUTPATIENT)
Dept: CT IMAGING | Facility: HOSPITAL | Age: 44
End: 2024-07-11
Payer: MEDICAID

## 2024-07-11 ENCOUNTER — ANESTHESIA EVENT (OUTPATIENT)
Dept: PERIOP | Facility: HOSPITAL | Age: 44
End: 2024-07-11
Payer: MEDICAID

## 2024-07-11 ENCOUNTER — HOSPITAL ENCOUNTER (OUTPATIENT)
Facility: HOSPITAL | Age: 44
Setting detail: OBSERVATION
Discharge: HOME | End: 2024-07-12
Attending: EMERGENCY MEDICINE | Admitting: EMERGENCY MEDICINE
Payer: MEDICAID

## 2024-07-11 DIAGNOSIS — M54.50 ACUTE MIDLINE LOW BACK PAIN, UNSPECIFIED WHETHER SCIATICA PRESENT: ICD-10-CM

## 2024-07-11 DIAGNOSIS — R52 INTRACTABLE PAIN: ICD-10-CM

## 2024-07-11 DIAGNOSIS — Z98.890 HISTORY OF EXCISION OF PILONIDAL CYST: Primary | ICD-10-CM

## 2024-07-11 DIAGNOSIS — L05.91 PILONIDAL CYST: ICD-10-CM

## 2024-07-11 LAB
ALBUMIN SERPL-MCNC: 4.5 G/DL (ref 3.5–5.2)
ALBUMIN/GLOB SERPL: 1.1 G/DL
ALP SERPL-CCNC: 89 U/L (ref 39–117)
ALT SERPL W P-5'-P-CCNC: 15 U/L (ref 1–33)
ANION GAP SERPL CALCULATED.3IONS-SCNC: 11.6 MMOL/L (ref 5–15)
AST SERPL-CCNC: 20 U/L (ref 1–32)
BASOPHILS # BLD AUTO: 0.06 10*3/MM3 (ref 0–0.2)
BASOPHILS NFR BLD AUTO: 0.7 % (ref 0–1.5)
BILIRUB SERPL-MCNC: 0.3 MG/DL (ref 0–1.2)
BUN SERPL-MCNC: 8 MG/DL (ref 6–20)
BUN/CREAT SERPL: 14.5 (ref 7–25)
CALCIUM SPEC-SCNC: 9.9 MG/DL (ref 8.6–10.5)
CHLORIDE SERPL-SCNC: 103 MMOL/L (ref 98–107)
CO2 SERPL-SCNC: 22.4 MMOL/L (ref 22–29)
CREAT SERPL-MCNC: 0.55 MG/DL (ref 0.57–1)
DEPRECATED RDW RBC AUTO: 44.3 FL (ref 37–54)
EGFRCR SERPLBLD CKD-EPI 2021: 118.3 ML/MIN/1.73
EOSINOPHIL # BLD AUTO: 0.29 10*3/MM3 (ref 0–0.4)
EOSINOPHIL NFR BLD AUTO: 3.5 % (ref 0.3–6.2)
ERYTHROCYTE [DISTWIDTH] IN BLOOD BY AUTOMATED COUNT: 12.7 % (ref 12.3–15.4)
GLOBULIN UR ELPH-MCNC: 4.1 GM/DL
GLUCOSE SERPL-MCNC: 112 MG/DL (ref 65–99)
HCG SERPL QL: NEGATIVE
HCT VFR BLD AUTO: 41.1 % (ref 34–46.6)
HGB BLD-MCNC: 14.1 G/DL (ref 12–15.9)
IMM GRANULOCYTES # BLD AUTO: 0.02 10*3/MM3 (ref 0–0.05)
IMM GRANULOCYTES NFR BLD AUTO: 0.2 % (ref 0–0.5)
LYMPHOCYTES # BLD AUTO: 1.69 10*3/MM3 (ref 0.7–3.1)
LYMPHOCYTES NFR BLD AUTO: 20.6 % (ref 19.6–45.3)
MCH RBC QN AUTO: 32 PG (ref 26.6–33)
MCHC RBC AUTO-ENTMCNC: 34.3 G/DL (ref 31.5–35.7)
MCV RBC AUTO: 93.4 FL (ref 79–97)
MONOCYTES # BLD AUTO: 0.54 10*3/MM3 (ref 0.1–0.9)
MONOCYTES NFR BLD AUTO: 6.6 % (ref 5–12)
NEUTROPHILS NFR BLD AUTO: 5.6 10*3/MM3 (ref 1.7–7)
NEUTROPHILS NFR BLD AUTO: 68.4 % (ref 42.7–76)
NRBC BLD AUTO-RTO: 0 /100 WBC (ref 0–0.2)
PLATELET # BLD AUTO: 358 10*3/MM3 (ref 140–450)
PMV BLD AUTO: 9.2 FL (ref 6–12)
POTASSIUM SERPL-SCNC: 3.9 MMOL/L (ref 3.5–5.2)
PROT SERPL-MCNC: 8.6 G/DL (ref 6–8.5)
RBC # BLD AUTO: 4.4 10*6/MM3 (ref 3.77–5.28)
SODIUM SERPL-SCNC: 137 MMOL/L (ref 136–145)
WBC NRBC COR # BLD AUTO: 8.2 10*3/MM3 (ref 3.4–10.8)

## 2024-07-11 PROCEDURE — G0378 HOSPITAL OBSERVATION PER HR: HCPCS

## 2024-07-11 PROCEDURE — 25010000002 ONDANSETRON PER 1 MG: Performed by: ANESTHESIOLOGY

## 2024-07-11 PROCEDURE — 96375 TX/PRO/DX INJ NEW DRUG ADDON: CPT

## 2024-07-11 PROCEDURE — 96374 THER/PROPH/DIAG INJ IV PUSH: CPT

## 2024-07-11 PROCEDURE — 10081 I&D PILONIDAL CYST COMP: CPT | Performed by: STUDENT IN AN ORGANIZED HEALTH CARE EDUCATION/TRAINING PROGRAM

## 2024-07-11 PROCEDURE — 25510000001 IOPAMIDOL PER 1 ML: Performed by: NURSE PRACTITIONER

## 2024-07-11 PROCEDURE — 84703 CHORIONIC GONADOTROPIN ASSAY: CPT | Performed by: NURSE PRACTITIONER

## 2024-07-11 PROCEDURE — 25010000002 PROPOFOL 200 MG/20ML EMULSION: Performed by: NURSE ANESTHETIST, CERTIFIED REGISTERED

## 2024-07-11 PROCEDURE — 25010000002 BUPIVACAINE 0.25 % SOLUTION: Performed by: STUDENT IN AN ORGANIZED HEALTH CARE EDUCATION/TRAINING PROGRAM

## 2024-07-11 PROCEDURE — 85025 COMPLETE CBC W/AUTO DIFF WBC: CPT | Performed by: NURSE PRACTITIONER

## 2024-07-11 PROCEDURE — 80053 COMPREHEN METABOLIC PANEL: CPT | Performed by: NURSE PRACTITIONER

## 2024-07-11 PROCEDURE — 74177 CT ABD & PELVIS W/CONTRAST: CPT

## 2024-07-11 PROCEDURE — 25010000002 FENTANYL CITRATE (PF) 100 MCG/2ML SOLUTION: Performed by: NURSE ANESTHETIST, CERTIFIED REGISTERED

## 2024-07-11 PROCEDURE — 25010000002 KETOROLAC TROMETHAMINE PER 15 MG: Performed by: NURSE PRACTITIONER

## 2024-07-11 PROCEDURE — 25010000002 CEFAZOLIN PER 500 MG: Performed by: NURSE PRACTITIONER

## 2024-07-11 PROCEDURE — 25810000003 LACTATED RINGERS PER 1000 ML: Performed by: ANESTHESIOLOGY

## 2024-07-11 PROCEDURE — 25010000002 MORPHINE PER 10 MG: Performed by: NURSE PRACTITIONER

## 2024-07-11 PROCEDURE — 99221 1ST HOSP IP/OBS SF/LOW 40: CPT | Performed by: STUDENT IN AN ORGANIZED HEALTH CARE EDUCATION/TRAINING PROGRAM

## 2024-07-11 PROCEDURE — 99285 EMERGENCY DEPT VISIT HI MDM: CPT

## 2024-07-11 PROCEDURE — 25010000002 SUGAMMADEX 200 MG/2ML SOLUTION: Performed by: ANESTHESIOLOGY

## 2024-07-11 PROCEDURE — 25810000003 LACTATED RINGERS PER 1000 ML: Performed by: NURSE ANESTHETIST, CERTIFIED REGISTERED

## 2024-07-11 PROCEDURE — 25010000002 DEXAMETHASONE PER 1 MG: Performed by: NURSE ANESTHETIST, CERTIFIED REGISTERED

## 2024-07-11 RX ORDER — ALUMINA, MAGNESIA, AND SIMETHICONE 2400; 2400; 240 MG/30ML; MG/30ML; MG/30ML
15 SUSPENSION ORAL EVERY 6 HOURS PRN
Status: DISCONTINUED | OUTPATIENT
Start: 2024-07-11 | End: 2024-07-12 | Stop reason: HOSPADM

## 2024-07-11 RX ORDER — IPRATROPIUM BROMIDE AND ALBUTEROL SULFATE 2.5; .5 MG/3ML; MG/3ML
3 SOLUTION RESPIRATORY (INHALATION) ONCE AS NEEDED
Status: DISCONTINUED | OUTPATIENT
Start: 2024-07-11 | End: 2024-07-11 | Stop reason: HOSPADM

## 2024-07-11 RX ORDER — PROMETHAZINE HYDROCHLORIDE 25 MG/1
25 TABLET ORAL ONCE AS NEEDED
Status: DISCONTINUED | OUTPATIENT
Start: 2024-07-11 | End: 2024-07-11 | Stop reason: HOSPADM

## 2024-07-11 RX ORDER — ONDANSETRON 2 MG/ML
INJECTION INTRAMUSCULAR; INTRAVENOUS AS NEEDED
Status: DISCONTINUED | OUTPATIENT
Start: 2024-07-11 | End: 2024-07-11 | Stop reason: SURG

## 2024-07-11 RX ORDER — ONDANSETRON 2 MG/ML
4 INJECTION INTRAMUSCULAR; INTRAVENOUS EVERY 6 HOURS PRN
Status: DISCONTINUED | OUTPATIENT
Start: 2024-07-11 | End: 2024-07-12 | Stop reason: HOSPADM

## 2024-07-11 RX ORDER — PROPOFOL 10 MG/ML
INJECTION, EMULSION INTRAVENOUS AS NEEDED
Status: DISCONTINUED | OUTPATIENT
Start: 2024-07-11 | End: 2024-07-11 | Stop reason: SURG

## 2024-07-11 RX ORDER — ONDANSETRON 2 MG/ML
4 INJECTION INTRAMUSCULAR; INTRAVENOUS ONCE AS NEEDED
Status: DISCONTINUED | OUTPATIENT
Start: 2024-07-11 | End: 2024-07-11 | Stop reason: HOSPADM

## 2024-07-11 RX ORDER — POLYETHYLENE GLYCOL 3350 17 G/17G
17 POWDER, FOR SOLUTION ORAL DAILY PRN
Status: DISCONTINUED | OUTPATIENT
Start: 2024-07-11 | End: 2024-07-12 | Stop reason: HOSPADM

## 2024-07-11 RX ORDER — ROCURONIUM BROMIDE 10 MG/ML
INJECTION, SOLUTION INTRAVENOUS AS NEEDED
Status: DISCONTINUED | OUTPATIENT
Start: 2024-07-11 | End: 2024-07-11 | Stop reason: SURG

## 2024-07-11 RX ORDER — DROPERIDOL 2.5 MG/ML
0.62 INJECTION, SOLUTION INTRAMUSCULAR; INTRAVENOUS
Status: DISCONTINUED | OUTPATIENT
Start: 2024-07-11 | End: 2024-07-11 | Stop reason: HOSPADM

## 2024-07-11 RX ORDER — FENTANYL CITRATE 50 UG/ML
INJECTION, SOLUTION INTRAMUSCULAR; INTRAVENOUS AS NEEDED
Status: DISCONTINUED | OUTPATIENT
Start: 2024-07-11 | End: 2024-07-11 | Stop reason: SURG

## 2024-07-11 RX ORDER — KETOROLAC TROMETHAMINE 30 MG/ML
15 INJECTION, SOLUTION INTRAMUSCULAR; INTRAVENOUS EVERY 6 HOURS PRN
Status: DISCONTINUED | OUTPATIENT
Start: 2024-07-11 | End: 2024-07-12 | Stop reason: HOSPADM

## 2024-07-11 RX ORDER — AMOXICILLIN 250 MG
2 CAPSULE ORAL 2 TIMES DAILY PRN
Status: DISCONTINUED | OUTPATIENT
Start: 2024-07-11 | End: 2024-07-12 | Stop reason: HOSPADM

## 2024-07-11 RX ORDER — OXYCODONE HYDROCHLORIDE 5 MG/1
5 TABLET ORAL EVERY 4 HOURS PRN
Status: DISCONTINUED | OUTPATIENT
Start: 2024-07-11 | End: 2024-07-12 | Stop reason: HOSPADM

## 2024-07-11 RX ORDER — ONDANSETRON 4 MG/1
4 TABLET, ORALLY DISINTEGRATING ORAL EVERY 6 HOURS PRN
Status: DISCONTINUED | OUTPATIENT
Start: 2024-07-11 | End: 2024-07-12 | Stop reason: HOSPADM

## 2024-07-11 RX ORDER — FLUMAZENIL 0.1 MG/ML
0.2 INJECTION INTRAVENOUS AS NEEDED
Status: DISCONTINUED | OUTPATIENT
Start: 2024-07-11 | End: 2024-07-11 | Stop reason: HOSPADM

## 2024-07-11 RX ORDER — UREA 10 %
5 LOTION (ML) TOPICAL NIGHTLY PRN
Status: DISCONTINUED | OUTPATIENT
Start: 2024-07-11 | End: 2024-07-12 | Stop reason: HOSPADM

## 2024-07-11 RX ORDER — SODIUM CHLORIDE 0.9 % (FLUSH) 0.9 %
10 SYRINGE (ML) INJECTION AS NEEDED
Status: DISCONTINUED | OUTPATIENT
Start: 2024-07-11 | End: 2024-07-12 | Stop reason: HOSPADM

## 2024-07-11 RX ORDER — SODIUM CHLORIDE, SODIUM LACTATE, POTASSIUM CHLORIDE, CALCIUM CHLORIDE 600; 310; 30; 20 MG/100ML; MG/100ML; MG/100ML; MG/100ML
1000 INJECTION, SOLUTION INTRAVENOUS CONTINUOUS
Status: DISCONTINUED | OUTPATIENT
Start: 2024-07-11 | End: 2024-07-12 | Stop reason: HOSPADM

## 2024-07-11 RX ORDER — SODIUM CHLORIDE 9 MG/ML
40 INJECTION, SOLUTION INTRAVENOUS AS NEEDED
Status: DISCONTINUED | OUTPATIENT
Start: 2024-07-11 | End: 2024-07-12 | Stop reason: HOSPADM

## 2024-07-11 RX ORDER — DIPHENHYDRAMINE HYDROCHLORIDE 50 MG/ML
12.5 INJECTION INTRAMUSCULAR; INTRAVENOUS
Status: DISCONTINUED | OUTPATIENT
Start: 2024-07-11 | End: 2024-07-11 | Stop reason: HOSPADM

## 2024-07-11 RX ORDER — SODIUM CHLORIDE 0.9 % (FLUSH) 0.9 %
10 SYRINGE (ML) INJECTION EVERY 12 HOURS SCHEDULED
Status: DISCONTINUED | OUTPATIENT
Start: 2024-07-11 | End: 2024-07-12 | Stop reason: HOSPADM

## 2024-07-11 RX ORDER — FENTANYL CITRATE 50 UG/ML
50 INJECTION, SOLUTION INTRAMUSCULAR; INTRAVENOUS
Status: DISCONTINUED | OUTPATIENT
Start: 2024-07-11 | End: 2024-07-11 | Stop reason: HOSPADM

## 2024-07-11 RX ORDER — DEXAMETHASONE SODIUM PHOSPHATE 4 MG/ML
INJECTION, SOLUTION INTRA-ARTICULAR; INTRALESIONAL; INTRAMUSCULAR; INTRAVENOUS; SOFT TISSUE AS NEEDED
Status: DISCONTINUED | OUTPATIENT
Start: 2024-07-11 | End: 2024-07-11 | Stop reason: SURG

## 2024-07-11 RX ORDER — BACLOFEN 10 MG/1
10 TABLET ORAL
COMMUNITY
Start: 2024-07-11

## 2024-07-11 RX ORDER — SODIUM CHLORIDE, SODIUM LACTATE, POTASSIUM CHLORIDE, CALCIUM CHLORIDE 600; 310; 30; 20 MG/100ML; MG/100ML; MG/100ML; MG/100ML
INJECTION, SOLUTION INTRAVENOUS CONTINUOUS PRN
Status: DISCONTINUED | OUTPATIENT
Start: 2024-07-11 | End: 2024-07-11 | Stop reason: SURG

## 2024-07-11 RX ORDER — PROMETHAZINE HYDROCHLORIDE 25 MG/1
25 SUPPOSITORY RECTAL ONCE AS NEEDED
Status: DISCONTINUED | OUTPATIENT
Start: 2024-07-11 | End: 2024-07-11 | Stop reason: HOSPADM

## 2024-07-11 RX ORDER — AMLODIPINE BESYLATE 5 MG/1
5 TABLET ORAL DAILY
Status: DISCONTINUED | OUTPATIENT
Start: 2024-07-11 | End: 2024-07-12 | Stop reason: HOSPADM

## 2024-07-11 RX ORDER — KETOROLAC TROMETHAMINE 30 MG/ML
15 INJECTION, SOLUTION INTRAMUSCULAR; INTRAVENOUS ONCE
Status: COMPLETED | OUTPATIENT
Start: 2024-07-11 | End: 2024-07-11

## 2024-07-11 RX ORDER — LIDOCAINE HYDROCHLORIDE 20 MG/ML
INJECTION, SOLUTION EPIDURAL; INFILTRATION; INTRACAUDAL; PERINEURAL AS NEEDED
Status: DISCONTINUED | OUTPATIENT
Start: 2024-07-11 | End: 2024-07-11 | Stop reason: SURG

## 2024-07-11 RX ORDER — NALOXONE HCL 0.4 MG/ML
0.2 VIAL (ML) INJECTION AS NEEDED
Status: DISCONTINUED | OUTPATIENT
Start: 2024-07-11 | End: 2024-07-11 | Stop reason: HOSPADM

## 2024-07-11 RX ORDER — LISINOPRIL 20 MG/1
20 TABLET ORAL
Status: DISCONTINUED | OUTPATIENT
Start: 2024-07-11 | End: 2024-07-12 | Stop reason: HOSPADM

## 2024-07-11 RX ORDER — BISACODYL 5 MG/1
5 TABLET, DELAYED RELEASE ORAL DAILY PRN
Status: DISCONTINUED | OUTPATIENT
Start: 2024-07-11 | End: 2024-07-12 | Stop reason: HOSPADM

## 2024-07-11 RX ORDER — MORPHINE SULFATE 2 MG/ML
2 INJECTION, SOLUTION INTRAMUSCULAR; INTRAVENOUS ONCE
Status: COMPLETED | OUTPATIENT
Start: 2024-07-11 | End: 2024-07-11

## 2024-07-11 RX ORDER — BUPIVACAINE HYDROCHLORIDE 2.5 MG/ML
INJECTION, SOLUTION INFILTRATION; PERINEURAL AS NEEDED
Status: DISCONTINUED | OUTPATIENT
Start: 2024-07-11 | End: 2024-07-11 | Stop reason: HOSPADM

## 2024-07-11 RX ORDER — BISACODYL 10 MG
10 SUPPOSITORY, RECTAL RECTAL DAILY PRN
Status: DISCONTINUED | OUTPATIENT
Start: 2024-07-11 | End: 2024-07-12 | Stop reason: HOSPADM

## 2024-07-11 RX ORDER — HYDROCHLOROTHIAZIDE 25 MG/1
25 TABLET ORAL
Status: DISCONTINUED | OUTPATIENT
Start: 2024-07-11 | End: 2024-07-12 | Stop reason: HOSPADM

## 2024-07-11 RX ORDER — SODIUM CHLORIDE 0.9 % (FLUSH) 0.9 %
10 SYRINGE (ML) INJECTION AS NEEDED
Status: DISCONTINUED | OUTPATIENT
Start: 2024-07-11 | End: 2024-07-11 | Stop reason: HOSPADM

## 2024-07-11 RX ADMIN — SUGAMMADEX 200 MG: 100 INJECTION, SOLUTION INTRAVENOUS at 19:10

## 2024-07-11 RX ADMIN — IOPAMIDOL 100 ML: 755 INJECTION, SOLUTION INTRAVENOUS at 09:30

## 2024-07-11 RX ADMIN — Medication 10 ML: at 21:08

## 2024-07-11 RX ADMIN — HYDROCHLOROTHIAZIDE 25 MG: 25 TABLET ORAL at 21:08

## 2024-07-11 RX ADMIN — SODIUM CHLORIDE 2000 MG: 900 INJECTION INTRAVENOUS at 18:32

## 2024-07-11 RX ADMIN — AMLODIPINE BESYLATE 5 MG: 5 TABLET ORAL at 21:08

## 2024-07-11 RX ADMIN — SODIUM CHLORIDE, SODIUM LACTATE, POTASSIUM CHLORIDE, AND CALCIUM CHLORIDE: .6; .31; .03; .02 INJECTION, SOLUTION INTRAVENOUS at 18:39

## 2024-07-11 RX ADMIN — FENTANYL CITRATE 50 MCG: 50 INJECTION, SOLUTION INTRAMUSCULAR; INTRAVENOUS at 18:42

## 2024-07-11 RX ADMIN — SODIUM CHLORIDE, POTASSIUM CHLORIDE, SODIUM LACTATE AND CALCIUM CHLORIDE 1000 ML: 600; 310; 30; 20 INJECTION, SOLUTION INTRAVENOUS at 23:56

## 2024-07-11 RX ADMIN — LIDOCAINE HYDROCHLORIDE 50 MG: 20 INJECTION, SOLUTION EPIDURAL; INFILTRATION; INTRACAUDAL; PERINEURAL at 18:42

## 2024-07-11 RX ADMIN — PROPOFOL 200 MG: 10 INJECTION, EMULSION INTRAVENOUS at 18:42

## 2024-07-11 RX ADMIN — ONDANSETRON 4 MG: 2 INJECTION INTRAMUSCULAR; INTRAVENOUS at 19:01

## 2024-07-11 RX ADMIN — ROCURONIUM BROMIDE 50 MG: 10 INJECTION, SOLUTION INTRAVENOUS at 18:42

## 2024-07-11 RX ADMIN — MORPHINE SULFATE 2 MG: 2 INJECTION, SOLUTION INTRAMUSCULAR; INTRAVENOUS at 11:21

## 2024-07-11 RX ADMIN — FENTANYL CITRATE 50 MCG: 50 INJECTION, SOLUTION INTRAMUSCULAR; INTRAVENOUS at 18:51

## 2024-07-11 RX ADMIN — KETOROLAC TROMETHAMINE 15 MG: 30 INJECTION, SOLUTION INTRAMUSCULAR at 08:44

## 2024-07-11 RX ADMIN — LISINOPRIL 20 MG: 20 TABLET ORAL at 21:08

## 2024-07-11 RX ADMIN — SODIUM CHLORIDE, POTASSIUM CHLORIDE, SODIUM LACTATE AND CALCIUM CHLORIDE 1000 ML: 600; 310; 30; 20 INJECTION, SOLUTION INTRAVENOUS at 18:32

## 2024-07-11 RX ADMIN — OXYCODONE 5 MG: 5 TABLET ORAL at 21:08

## 2024-07-11 RX ADMIN — DEXAMETHASONE SODIUM PHOSPHATE 4 MG: 4 INJECTION, SOLUTION INTRAMUSCULAR; INTRAVENOUS at 18:48

## 2024-07-11 NOTE — H&P
[facility] H&P    Patient Name: Kathi Mason  : 1980  MRN: [id number]  Primary Care Physician: Allie Huertas APRN  Date of admission: 2024     Patient Care Team:  Allie Huertas APRN as PCP - General (Nurse Practitioner)  Hayley Kinney MD as PCP - Family Medicine  Gómez Espino MD as Consulting Physician (General Surgery)  Brain Reid MD as Consulting Physician (Colon and Rectal Surgery)          Subjective   History Present Illness     Chief Complaint:   Chief Complaint   Patient presents with    Abscess     Pt had a Pilonidal cyst in her rectum and has drain tube that is coming out.  Pt is is pain from the multiple cysts in rectal area.       History of Present Illness  Obtained from ED provider HPI on 2024:  42 y/o -American female who has a history of CP and numerous pilonidal cyst with recent surgery 2 months ago performed by Dr. Espino and then Dr. Blanco as presents to the emergency room with complaint of drainage tube came out 3 days ago.  Patient reports that she presents to the emergency room because she has increased pain in her back region.  Patient states it is hard to sit due to pain and swelling.  Patient denies fever nausea or vomiting.    24:  Patient confirms the HPI noted above including a history of multiple pilonidal cyst which have required I&D's as well as drain placements in the past.  She notes that for the past several days she has had worsening pain and tenderness and has had persistent drainage since previous drainage approximately 1 month prior.  Some recent nausea and vomiting with the more intense pain has been noted but she denies any fever.        Review of Systems   Constitutional: Negative.   HENT: Negative.     Eyes: Negative.    Cardiovascular: Negative.    Respiratory: Negative.     Skin:  Positive for poor wound healing.   Musculoskeletal: Negative.    Gastrointestinal: Negative.    Genitourinary: Negative.     Neurological: Negative.    Psychiatric/Behavioral: Negative.             Personal History     Past Medical History:   Past Medical History:   Diagnosis Date    Cerebral palsy     Hypertension     PONV (postoperative nausea and vomiting)        Surgical History:      Past Surgical History:   Procedure Laterality Date    ANKLE SURGERY      AXILLARY LYMPH NODE BIOPSY/EXCISION      EYE SURGERY      HIP SURGERY      LEG SURGERY Bilateral     MASS EXCISION N/A 7/14/2023    Procedure: PILONIDAL CYSTECTOMY, patient prone;  Surgeon: Gómez Espino MD;  Location: [facility] MAIN OR;  Service: General;  Laterality: N/A;    PILONIDAL CYSTECTOMY N/A 2/27/2024    Procedure: PILONIDAL CYSTECTOMY;  Surgeon: Brain Reid MD;  Location: [facility] MAIN OR;  Service: General;  Laterality: N/A;           Family History: family history includes Hypertension in her mother. Otherwise pertinent FHx was reviewed and unremarkable.     Social History:  reports that she has been smoking cigarettes. She has been exposed to tobacco smoke. She has never used smokeless tobacco. She reports that she does not currently use alcohol. She reports that she does not currently use drugs.      Medications:  Prior to Admission medications    Medication Sig Start Date End Date Taking? Authorizing Provider   amLODIPine (NORVASC) 5 MG tablet Take 1 tablet by mouth Daily.    ProviderGlenn MD   baclofen (LIORESAL) 10 MG tablet Take 2 tablets by mouth Daily. 6/1/23   Glenn Wilkins MD   lisinopril-hydrochlorothiazide (PRINZIDE,ZESTORETIC) 20-25 MG per tablet Take 1 tablet by mouth Daily. HOLD 24 hours before surgery    ProviderGlenn MD   Menthol-Zinc Oxide (Calmoseptine) 0.44-20.6 % ointment Apply 1 Application topically to the appropriate area as directed 2 (Two) Times a Day. 3/20/24   Brain Reid MD       Allergies:    Allergies   Allergen Reactions    Latex Unknown - High Severity       Objective   Objective     Vital Signs  Temp:  [98.8  °F (37.1 °C)] 98.8 °F (37.1 °C)  Heart Rate:  [] 83  Resp:  [20] 20  BP: (116-169)/() 156/97  SpO2:  [97 %-100 %] 98 %  on   ;      Body mass index is 21.97 kg/m².    Physical Exam  Vitals reviewed.   Constitutional:       General: She is not in acute distress.     Appearance: Normal appearance. She is normal weight. She is not ill-appearing, toxic-appearing or diaphoretic.   HENT:      Head: Normocephalic.      Right Ear: External ear normal.      Left Ear: External ear normal.      Nose: Nose normal.      Mouth/Throat:      Mouth: Mucous membranes are moist.   Eyes:      Extraocular Movements: Extraocular movements intact.   Cardiovascular:      Rate and Rhythm: Normal rate and regular rhythm.      Pulses: Normal pulses.   Pulmonary:      Effort: Pulmonary effort is normal.      Breath sounds: Normal breath sounds.   Abdominal:      General: Bowel sounds are normal.      Palpations: Abdomen is soft.   Musculoskeletal:         General: Swelling and tenderness present.      Cervical back: Normal range of motion.      Right lower leg: No edema.      Left lower leg: No edema.   Skin:     General: Skin is warm and dry.      Capillary Refill: Capillary refill takes less than 2 seconds.   Neurological:      General: No focal deficit present.      Mental Status: She is alert.   Psychiatric:         Mood and Affect: Mood normal.         Behavior: Behavior normal.         Thought Content: Thought content normal.         Judgment: Judgment normal.           Results Review:  I have personally reviewed most recent lab results and radiology images and interpretations and agree with findings, most notably: CMP, CBC, hCG and CT of abdomen and pelvis.    Results from last 7 days   Lab Units 07/11/24  0843   WBC 10*3/mm3 8.20   HEMOGLOBIN g/dL 14.1   HEMATOCRIT % 41.1   PLATELETS 10*3/mm3 358     Results from last 7 days   Lab Units 07/11/24  0843   SODIUM mmol/L 137   POTASSIUM mmol/L 3.9   CHLORIDE mmol/L 103   CO2  mmol/L 22.4   BUN mg/dL 8   CREATININE mg/dL 0.55*   GLUCOSE mg/dL 112*   CALCIUM mg/dL 9.9   ALK PHOS U/L 89   ALT (SGPT) U/L 15   AST (SGOT) U/L 20     Estimated Creatinine Clearance: 119.4 mL/min (A) (by C-G formula based on SCr of 0.55 mg/dL (L)).  Brief Urine Lab Results  (Last result in the past 365 days)        Color   Clarity   Blood   Leuk Est   Nitrite   Protein   CREAT   Urine HCG        02/27/24 0702               Negative               Microbiology Results (last 10 days)       ** No results found for the last 240 hours. **            ECG/EMG Results (most recent)       None                    CT Abdomen Pelvis With Contrast    Result Date: 7/11/2024  Impression: 1.Thick-walled cyst and more solid areas of increased density in the cephalad margin of the intergluteal crease as discussed above. 2.Nonspecific thin round area of increased density within the left intergluteal crease subcutaneous fat near the perineum presumably related to previous intervention. Correlate with symptoms and history. 3.There is a 4.9 cm right ovarian cyst with potential hydrosalpinx. Follow-up pelvic ultrasound in 6-12 weeks recommended. Electronically Signed: Jeremy Moreno MD  7/11/2024 9:44 AM EDT  Workstation ID: [id number]       Estimated Creatinine Clearance: 119.4 mL/min (A) (by C-G formula based on SCr of 0.55 mg/dL (L)).    Assessment & Plan   Assessment/Plan       Active Hospital Problems    Diagnosis  POA    **Intractable pain [R52]  Yes      Resolved Hospital Problems   No resolved problems to display.       Intractable pain with history of pilonidal cyst  -WBCs: 8.20  -Pregnancy test negative  -CT of abdomen and pelvis with contrast showed thick walled cyst and more solid areas of increased density in the cephalic margin of the inguinal crease with nonspecific thin round area of increased density within the left inguinal crease subcutaneous fat near the perineum presumably related to previous intervention.  A 4.9 cm  right ovarian cyst with potential hydrosalpinx was also noted with recommendations for follow-up pelvic ultrasound in 6-12 weeks  -In the ED patient was given Toradol and morphine  -Colorectal surgeon consulted in ED  -N.p.o.    Hypertension  -Poorly controlled   BP Readings from Last 1 Encounters:   07/11/24 156/97   - Continue amlodipine, lisinopril and hydrochlorothiazide  - Monitor while admitted        VTE Prophylaxis - No Active Pharmacologic or Mechanical VTE Prophylaxis AND No VTE Prophylaxis Contraindications Documented   - Select Appropriate VTE Prophylaxis      CODE STATUS:    There are no questions and answers to display.       This patient has been examined wearing personal protective equipment.     I discussed the patient's findings and my recommendations with patient and nursing staff.      Signature:Electronically signed by Kamar Rene PA-C, 07/11/24, 12:47 PM EDT.

## 2024-07-11 NOTE — SIGNIFICANT NOTE
07/11/24 1552   Living Situation   Current Living Arrangements home   Potentially Unsafe Housing Conditions none   Food Insecurity   Within the past 12 months, you worried that your food would run out before you got the money to buy more. Never true   Within the past 12 months, the food you bought just didn't last and you didn't have money to get more. Never true   Transportation Needs   In the past 12 months, has lack of transportation kept you from medical appointments or from getting medications? yes   In the past 12 months, has lack of transportation kept you from meetings, work, or from getting things needed for daily living? Yes   Utilities   In the past 12 months has the electric, gas, oil, or water company threatened to shut off services in your home? No   Abuse Screen (yes response referral indicated)   Feels Unsafe at Home or Work/School no   Feels Threatened by Someone no   Does Anyone Try to Keep You From Having Contact with Others or Doing Things Outside Your Home? no   Physical Signs of Abuse Present no   Financial Resource Strain   How hard is it for you to pay for the very basics like food, housing, medical care, and heating? Somewhat   Employment   Do you want help finding or keeping work or a job? Patient declined   Family and Community Support   If for any reason you need help with day-to-day activities such as bathing, preparing meals, shopping, managing finances, etc., do you get the help you need? I get all the help I need   How often do you feel lonely or isolated from those around you? Never   Education   Preferred Language English   Do you want help with school or training? For example, starting or completing job training or getting a high school diploma, GED or equivalent No   Physical Activity   On average, how many days per week do you engage in moderate to strenuous exercise (like a brisk walk)? 0 days   On average, how many minutes do you engage in exercise at this level? 0 min    Number of minutes of exercise per week (!) 0   Alcohol Use   Q1: How often do you have a drink containing alcohol? Never   Q2: How many drinks containing alcohol do you have on a typical day when you are drinking? None   Q3: How often do you have six or more drinks on one occasion? Never   Stress   Do you feel stress - tense, restless, nervous, or anxious, or unable to sleep at night because your mind is troubled all the time - these days? Only a littl   Mental Health   Little Interest or Pleasure in Doing Things 0-->not at all   Feeling Down, Depressed or Hopeless 0-->not at all   Disabilities   Concentrating, Remembering or Making Decisions Difficulty no   Doing Errands Independently Difficulty (such as shopping) no  (mom provides transportation)

## 2024-07-11 NOTE — ANESTHESIA PROCEDURE NOTES
Airway  Urgency: elective    Date/Time: 7/11/2024 6:44 PM    General Information and Staff    Patient location during procedure: OR  CRNA/CAA: Jarvis Ferrari CRNA    Indications and Patient Condition  Indications for airway management: airway protection    Preoxygenated: yes  MILS maintained throughout  Mask difficulty assessment: 1 - vent by mask    Final Airway Details  Final airway type: endotracheal airway      Successful airway: ETT     Successful intubation technique: direct laryngoscopy  Endotracheal tube insertion site: oral  Blade: Ifeanyi  Blade size: 3  ETT size (mm): 7.0  Cormack-Lehane Classification: grade I - full view of glottis  Placement verified by: chest auscultation   Measured from: teeth  ETT/EBT  to teeth (cm): 22  Number of attempts at approach: 1  Assessment: lips, teeth, and gum same as pre-op and atraumatic intubation

## 2024-07-11 NOTE — ED NOTES
Called Dr. Reid's office again for JOAQUIN CRAWFORD I was told at this time that he is in surgery, I notified her and placed another called. I also placed a call to the OR asking them to have Dr. Reid call us when he is done operating.

## 2024-07-11 NOTE — ED NOTES
Nursing report ED to floor  Kathi Mason  43 y.o.  female    HPI:   Chief Complaint   Patient presents with    Abscess     Pt had a Pilonidal cyst in her rectum and has drain tube that is coming out.  Pt is is pain from the multiple cysts in rectal area.         Admitting doctor:   Rodrick Farrell MD    Admitting diagnosis:   The primary encounter diagnosis was History of excision of pilonidal cyst. Diagnoses of Acute midline low back pain, unspecified whether sciatica present and Intractable pain were also pertinent to this visit.    Code status:   Current Code Status       Date Active Code Status Order ID Comments User Context       7/11/2024 1227 CPR (Attempt to Resuscitate) [id number]  Kamar Rene PA-C ED        Question Answer    Code Status (Patient has no pulse and is not breathing) CPR (Attempt to Resuscitate)    Medical Interventions (Patient has pulse or is breathing) Full Support                    Allergies:   Latex    Isolation:  No active isolations     Fall Risk:  Fall Risk Assessment was completed, and patient is at moderate risk for falls.   Predictive Model Details         2 (Low) Factor Value    Calculated 7/11/2024 12:44 Age 43    Risk of Fall Model Active Peripheral IV Present     Imaging order in this encounter Present     Respiratory Rate 20     Magnesium not on file     Number of Distinct Medication Classes administered 3     Tobacco Use Current     Steve Scale not on file     Number of administrations of Anti-Rheumatics 1     Creatinine 0.55 mg/dL     Number of administrations of Analgesic Narcotics 1     Diastolic BP 97     Days after Admission 0.189     Chloride 103 mmol/L     Albumin 4.5 g/dL     ALT 15 U/L     Total Bilirubin 0.3 mg/dL     Potassium 3.9 mmol/L     Calcium 9.9 mg/dL         Weight:       07/11/24  0810   Weight: 56.2 kg (124 lb)       Intake and Output  No intake or output data in the 24 hours ending 07/11/24 1244    Diet:        Most recent vitals:   Vitals:     07/11/24 0821 07/11/24 0902 07/11/24 1047 07/11/24 1146   BP: 126/89 116/80 128/84 156/97   BP Location:       Patient Position:       Pulse:  87 83 83   Resp:  20 20 20   Temp:       TempSrc:       SpO2:  98% 97% 98%   Weight:       Height:           Active LDAs/IV Access:   Lines, Drains & Airways       Active LDAs       Name Placement date Placement time Site Days    Peripheral IV 07/11/24 0842 Anterior;Right Forearm 07/11/24  0842  Forearm  less than 1                    Skin Condition:   Skin Assessments (last day)       None             Labs (abnormal labs have a star):   Labs Reviewed   COMPREHENSIVE METABOLIC PANEL - Abnormal; Notable for the following components:       Result Value    Glucose 112 (*)     Creatinine 0.55 (*)     Total Protein 8.6 (*)     All other components within normal limits    Narrative:     GFR Normal >60  Chronic Kidney Disease <60  Kidney Failure <15     CBC WITH AUTO DIFFERENTIAL - Normal   HCG, SERUM, QUALITATIVE - Normal   CBC AND DIFFERENTIAL    Narrative:     The following orders were created for panel order CBC & Differential.  Procedure                               Abnormality         Status                     ---------                               -----------         ------                     CBC Auto Differential[[id number]]        Normal              Final result                 Please view results for these tests on the individual orders.       LOC: Person, Place, Time, and Situation    Telemetry:  Med/Surg    Cardiac Monitoring Ordered: no    EKG:   No orders to display       Medications Given in the ED:   Medications   sodium chloride 0.9 % flush 10 mL (has no administration in time range)   ceFAZolin 1000 mg IVPB in 100 mL NS (MBP) (has no administration in time range)   ketorolac (TORADOL) injection 15 mg (15 mg Intravenous Given 7/11/24 0844)   iopamidol (ISOVUE-370) 76 % injection 100 mL (100 mL Intravenous Given 7/11/24 0930)   morphine injection 2 mg (2 mg  Intravenous Given 7/11/24 1121)       Imaging results:  CT Abdomen Pelvis With Contrast    Result Date: 7/11/2024  Impression: 1.Thick-walled cyst and more solid areas of increased density in the cephalad margin of the intergluteal crease as discussed above. 2.Nonspecific thin round area of increased density within the left intergluteal crease subcutaneous fat near the perineum presumably related to previous intervention. Correlate with symptoms and history. 3.There is a 4.9 cm right ovarian cyst with potential hydrosalpinx. Follow-up pelvic ultrasound in 6-12 weeks recommended. Electronically Signed: Jeremy Moreno MD  7/11/2024 9:44 AM EDT  Workstation ID: [id number]     Social issues:   Social History     Socioeconomic History    Marital status: Single   Tobacco Use    Smoking status: Every Day     Current packs/day: 0.50     Types: Cigarettes     Passive exposure: Current    Smokeless tobacco: Never   Vaping Use    Vaping status: Never Used   Substance and Sexual Activity    Alcohol use: Not Currently     Comment: occ    Drug use: Not Currently    Sexual activity: Defer       NIH Stroke Scale:  Interval: (not recorded)  1a. Level of Consciousness: (not recorded)  1b. LOC Questions: (not recorded)  1c. LOC Commands: (not recorded)  2. Best Gaze: (not recorded)  3. Visual: (not recorded)  4. Facial Palsy: (not recorded)  5a. Motor Arm, Left: (not recorded)  5b. Motor Arm, Right: (not recorded)  6a. Motor Leg, Left: (not recorded)  6b. Motor Leg, Right: (not recorded)  7. Limb Ataxia: (not recorded)  8. Sensory: (not recorded)  9. Best Language: (not recorded)  10. Dysarthria: (not recorded)  11. Extinction and Inattention (formerly Neglect): (not recorded)    Total (NIH Stroke Scale): (not recorded)     Additional notable assessment information:     Nursing report ED to floor:      Wesley Hanna RN   07/11/24 12:44 EDT

## 2024-07-11 NOTE — ED PROVIDER NOTES
Subjective   History of Present Illness  42 y/o -American female who has a history of CP and numerous pilonidal cyst with recent surgery 2 months ago performed by Dr. Espino and then Dr. Blanco as presents to the emergency room with complaint of drainage tube came out 3 days ago.  Patient reports that she presents to the emergency room because she has increased pain in her back region.  Patient states it is hard to sit due to pain and swelling.  Patient denies fever nausea or vomiting.      Review of Systems   Genitourinary:  Negative for dysuria.   Musculoskeletal:  Positive for back pain.   Skin:  Positive for wound.       Past Medical History:   Diagnosis Date    Cerebral palsy     Hypertension     PONV (postoperative nausea and vomiting)        Allergies   Allergen Reactions    Latex Unknown - High Severity       Past Surgical History:   Procedure Laterality Date    ANKLE SURGERY      AXILLARY LYMPH NODE BIOPSY/EXCISION      EYE SURGERY      HIP SURGERY      LEG SURGERY Bilateral     MASS EXCISION N/A 7/14/2023    Procedure: PILONIDAL CYSTECTOMY, patient prone;  Surgeon: Gómez Espino MD;  Location: [facility];  Service: General;  Laterality: N/A;    PILONIDAL CYSTECTOMY N/A 2/27/2024    Procedure: PILONIDAL CYSTECTOMY;  Surgeon: Brain Reid MD;  Location: [facility] OR;  Service: General;  Laterality: N/A;       Family History   Problem Relation Age of Onset    Hypertension Mother        Social History     Socioeconomic History    Marital status: Single   Tobacco Use    Smoking status: Every Day     Current packs/day: 0.50     Types: Cigarettes     Passive exposure: Current    Smokeless tobacco: Never   Vaping Use    Vaping status: Never Used   Substance and Sexual Activity    Alcohol use: Not Currently     Comment: occ    Drug use: Not Currently    Sexual activity: Defer           Objective   Physical Exam  Constitutional:       General: She is in acute distress.      Appearance: Normal  appearance. She is not ill-appearing, toxic-appearing or diaphoretic.   HENT:      Head: Normocephalic and atraumatic.      Mouth/Throat:      Mouth: Mucous membranes are moist.      Pharynx: Oropharynx is clear.   Cardiovascular:      Rate and Rhythm: Normal rate.      Pulses: Normal pulses.   Pulmonary:      Effort: Pulmonary effort is normal.   Abdominal:      Palpations: Abdomen is soft.   Skin:     General: Skin is warm and dry.      Capillary Refill: Capillary refill takes less than 2 seconds.   Neurological:      Mental Status: She is alert.   Psychiatric:         Mood and Affect: Mood normal.         Behavior: Behavior normal.         Thought Content: Thought content normal.         Judgment: Judgment normal.         Procedures           ED Course  ED Course as of 07/11/24 1247   Thu Jul 11, 2024   1115 Discussed workup and results of lab work and CT with patient and family and they both verbalized understanding.  Continue to await callback from Dr. Mack. [CT]   1141 3rd call out to Dr Mack now. [CT]   1159 Still awaiting return phone call from Dr Mack. [CT]   1200 Plan to place in ED OBS status and have Dr Mack consult. [CT]   1212 Dr Mack calls back and would like to see patient before admission. [CT]   1243 Dr Mack here to examine patient.  Plan to perform I&D later this evening in OR. [CT]      ED Course User Index  [CT] Bri Avina APRN      CT Abdomen Pelvis With Contrast    Result Date: 7/11/2024  Impression: 1.Thick-walled cyst and more solid areas of increased density in the cephalad margin of the intergluteal crease as discussed above. 2.Nonspecific thin round area of increased density within the left intergluteal crease subcutaneous fat near the perineum presumably related to previous intervention. Correlate with symptoms and history. 3.There is a 4.9 cm right ovarian cyst with potential hydrosalpinx. Follow-up pelvic ultrasound in 6-12 weeks recommended. Electronically Signed: Jeremy  MD Josh  7/11/2024 9:44 AM EDT  Workstation ID: [id number]                               Labs Reviewed   COMPREHENSIVE METABOLIC PANEL - Abnormal; Notable for the following components:       Result Value    Glucose 112 (*)     Creatinine 0.55 (*)     Total Protein 8.6 (*)     All other components within normal limits    Narrative:     GFR Normal >60  Chronic Kidney Disease <60  Kidney Failure <15     CBC WITH AUTO DIFFERENTIAL - Normal   HCG, SERUM, QUALITATIVE - Normal   CBC AND DIFFERENTIAL    Narrative:     The following orders were created for panel order CBC & Differential.  Procedure                               Abnormality         Status                     ---------                               -----------         ------                     CBC Auto Differential[[id number]]        Normal              Final result                 Please view results for these tests on the individual orders.            Medications   sodium chloride 0.9 % flush 10 mL (has no administration in time range)   ceFAZolin 1000 mg IVPB in 100 mL NS (MBP) (has no administration in time range)   ketorolac (TORADOL) injection 15 mg (15 mg Intravenous Given 7/11/24 0844)   iopamidol (ISOVUE-370) 76 % injection 100 mL (100 mL Intravenous Given 7/11/24 0930)   morphine injection 2 mg (2 mg Intravenous Given 7/11/24 1121)      Medical Decision Making  43-year-old -American female with history of CP and numerous pilonidal cyst that are reoccurring with recent surgery performed by Dr. Espino/[place] less than 2 months ago.  Patient states that she had a drain and the drain came out 3 days ago.  Patient states that she called the surgeon's office today and was referred to the ED.    Problems Addressed:  Acute midline low back pain, unspecified whether sciatica present:     Details: No sciatica present but patient has a long history of numerous pilonidal cysts that have I&D performed and surgery  History of excision of pilonidal cyst:      Details: Consult Dr. Mack.  Spoke with  at 12:15 and wants to see patient.  Intractable pain:     Details: Patient reports it is hard to sit.  Toradol was ordered and administered here in ED and patient states pain returned.  2 mg of morphine were ordered and administered.    Amount and/or Complexity of Data Reviewed  Labs: ordered.     Details: CBC, CMP are unremarkable.  NO fever reported or appreciated here in ED.  Radiology: ordered.     Details: CT Abdomen Pelvis With Contrast    Result Date: 7/11/2024  Impression: 1.Thick-walled cyst and more solid areas of increased density in the cephalad margin of the intergluteal crease as discussed above. 2.Nonspecific thin round area of increased density within the left intergluteal crease subcutaneous fat near the perineum presumably related to previous intervention. Correlate with symptoms and history. 3.There is a 4.9 cm right ovarian cyst with potential hydrosalpinx. Follow-up pelvic ultrasound in 6-12 weeks recommended. Electronically Signed: Jeremy Moreno MD  7/11/2024 9:44 AM EDT  Workstation ID: [id number]       Risk  Prescription drug management.  Decision regarding hospitalization.  Risk Details: Place in ED OBS for surgical I&D later this evening to be performed by Dr Mack.  Abx and pain control continued.        Final diagnoses:   History of excision of pilonidal cyst   Acute midline low back pain, unspecified whether sciatica present   Intractable pain       ED Disposition  ED Disposition       ED Disposition   Decision to Admit    Condition   --    Comment   Level of Care: Med/Surg [1]   Diagnosis: Intractable pain [829029]   Admitting Physician: DEREK CHAVIS [[id number]]   Attending Physician: DEREK CHAVIS [[id number]]                 No follow-up provider specified.       Medication List      No changes were made to your prescriptions during this visit.            Bri Avina, APRN  07/11/24 6107

## 2024-07-11 NOTE — BRIEF OP NOTE
PILONIDAL CYSTECTOMY  Progress Note    Marceljose LÓPEZ Marlon  7/11/2024    Pre-op Diagnosis:   Pilonidal abscess        Post-Op Diagnosis Codes:   . Pilonidal abscess  - severe hydradenitis     Procedure/CPT® Codes:        Procedure(s):  DRAINAGE OF PILONIDAL ABSCESS, SETON PLACEMENT              Surgeon(s):  Brain Reid MD    Anesthesia: General    Staff:   Circulator: Kathy Wells RN  Scrub Person: Ivet Tipton; Madeline Pillai         Estimated Blood Loss: minimal    Urine Voided: * No values recorded between 7/11/2024  6:39 PM and 7/11/2024  7:18 PM *    Specimens:                None          Drains: * No LDAs found *    Findings:  . Pilonidal abscess  - severe hydradenitis         Complications: none           Brain Reid MD     Date: 7/11/2024  Time: 19:29 EDT

## 2024-07-11 NOTE — CASE MANAGEMENT/SOCIAL WORK
Discharge Planning Assessment   Kike     Patient Name: Kathi Mason  MRN: [id number]  Today's Date: 7/11/2024    Admit Date: 7/11/2024    Plan: Routine home   Discharge Needs Assessment       Row Name 07/11/24 1606       Living Environment    People in Home alone    Current Living Arrangements home    Potentially Unsafe Housing Conditions none    In the past 12 months has the electric, gas, oil, or water company threatened to shut off services in your home? No    Primary Care Provided by self    Provides Primary Care For no one    Family Caregiver if Needed parent(s)    Family Caregiver Names mother Kim    Quality of Family Relationships helpful;involved;supportive    Able to Return to Prior Arrangements yes       Resource/Environmental Concerns    Resource/Environmental Concerns none    Transportation Concerns none       Transportation Needs    In the past 12 months, has lack of transportation kept you from medical appointments or from getting medications? yes    In the past 12 months, has lack of transportation kept you from meetings, work, or from getting things needed for daily living? Yes       Food Insecurity    Within the past 12 months, you worried that your food would run out before you got the money to buy more. Never true    Within the past 12 months, the food you bought just didn't last and you didn't have money to get more. Never true       Transition Planning    Patient/Family Anticipates Transition to home    Patient/Family Anticipated Services at Transition none    Transportation Anticipated family or friend will provide       Discharge Needs Assessment    Readmission Within the Last 30 Days no previous admission in last 30 days    Equipment Currently Used at Home wheelchair;walker, rolling;grab bar    Concerns to be Addressed discharge planning    Anticipated Changes Related to Illness none    Equipment Needed After Discharge none                   Discharge Plan       Row Name 07/11/24  1608       Plan    Plan Routine home    Plan Comments CM met with patient at the bedside. Confirmed PCP, insurance, and pharmacy. Patient denies any difficulty affording medications. Patient is not current with any HHC/OPPT/OT services. Patient lives at home alone with service animal, is IADLS, and does not drive. Mother Kim will provide DC transport and CM will add Medicaid transportation # to AVS. CM contacted  to place [facility] referral for additional resouces. DC Barriers:PILONIDAL CYSTECTOMY 7/11/24.                  Continued Care and Services - Admitted Since 7/11/2024    No active coordination exists for this encounter.       Expected Discharge Date and Time       Expected Discharge Date Expected Discharge Time    Jul 12, 2024            Demographic Summary       Row Name 07/11/24 1604       General Information    Admission Type observation    Arrived From emergency department    Referral Source admission list    Reason for Consult discharge planning    Preferred Language English                   Functional Status       Row Name 07/11/24 1604       Functional Status    Usual Activity Tolerance moderate    Current Activity Tolerance moderate       Functional Status, IADL    Medications independent    Meal Preparation independent    Housekeeping independent    Laundry independent    Shopping independent;assistive person           Rcihard Blunt RN     Cell number [phone]  Office number [phone]

## 2024-07-11 NOTE — CONSULTS
Colorectal Surgery Consultation Note    ID:  Kathi Mason;   : 1980  DATE OF VISIT: 2024    Chief Complaint  Abscess (Pt had a Pilonidal cyst in her rectum and has drain tube that is coming out.  Pt is is pain from the multiple cysts in rectal area./)       History of Present Illness  Kathi Mason is a 43 y.o. female who is well known to my service with recurrent pilonidal abscess and cutaneous abscess. Her drain recently fell off and she now presents with pain in the gluteal cleft. Denies any fever or chills. Denies any drainage.    CT Pelvis showed indurated fluid collection in the upper gluteal cleft.    Past Medical History  Past Medical History:   Diagnosis Date    Cerebral palsy     Hypertension     PONV (postoperative nausea and vomiting)      Past Surgical History  Past Surgical History:   Procedure Laterality Date    ANKLE SURGERY      AXILLARY LYMPH NODE BIOPSY/EXCISION      EYE SURGERY      HIP SURGERY      LEG SURGERY Bilateral     MASS EXCISION N/A 2023    Procedure: PILONIDAL CYSTECTOMY, patient prone;  Surgeon: Gómez Espino MD;  Location: [facility];  Service: General;  Laterality: N/A;    PILONIDAL CYSTECTOMY N/A 2024    Procedure: PILONIDAL CYSTECTOMY;  Surgeon: Brain Reid MD;  Location: [facility];  Service: General;  Laterality: N/A;     Family History  Family History   Problem Relation Age of Onset    Hypertension Mother      No colorectal cancer history in immediate family members.  Social History  Social History     Tobacco Use    Smoking status: Every Day     Current packs/day: 0.50     Types: Cigarettes     Passive exposure: Current    Smokeless tobacco: Never   Vaping Use    Vaping status: Never Used   Substance Use Topics    Alcohol use: Not Currently     Comment: occ    Drug use: Not Currently     Medication List  @medcurrent@  Allergies  Allergies   Allergen Reactions    Latex Unknown - High Severity     Review of Systems  All systems reviewed  and are otherwise negative, pertinent positives noted in the HPI.    Physical Exam  General:  No acute distress  Head: Normocephalic, atraumatic  Neuro: Alert and oriented      External anorectal exam: Perianal seton intact, fluctuant collection with tenderness at the superior gluteal cleft and inferiorly on the left side     Assessment  -recurrent pilonidal abscess     Plan / Recommendations    - OR for drainage and placement of vessel loop   - antibiotics with ancef for now  - ok for discharge after procedure with oral antibiotics       Brain Reid MD  Colon and Rectal Surgery   [facility]

## 2024-07-11 NOTE — ANESTHESIA PREPROCEDURE EVALUATION
Anesthesia Evaluation     Patient summary reviewed and Nursing notes reviewed   history of anesthetic complications:  PONV  NPO Solid Status: > 8 hours  NPO Liquid Status: > 8 hours           Airway   Mallampati: II  TM distance: >3 FB  Neck ROM: full  No difficulty expected  Dental    (+) edentulous    Pulmonary    Cardiovascular     (+) hypertension      Neuro/Psych  GI/Hepatic/Renal/Endo      Musculoskeletal     Abdominal    Substance History      OB/GYN          Other        ROS/Med Hx Other: Hx of cerebral palsy              Anesthesia Plan    ASA 3     general   total IV anesthesia  intravenous induction     Anesthetic plan, risks, benefits, and alternatives have been provided, discussed and informed consent has been obtained with: patient.    Plan discussed with CRNA.    CODE STATUS:    Code Status (Patient has no pulse and is not breathing): CPR (Attempt to Resuscitate)  Medical Interventions (Patient has pulse or is breathing): Full Support

## 2024-07-12 VITALS
RESPIRATION RATE: 15 BRPM | SYSTOLIC BLOOD PRESSURE: 109 MMHG | HEART RATE: 84 BPM | TEMPERATURE: 98.3 F | WEIGHT: 124 LBS | HEIGHT: 63 IN | DIASTOLIC BLOOD PRESSURE: 68 MMHG | OXYGEN SATURATION: 97 % | BODY MASS INDEX: 21.97 KG/M2

## 2024-07-12 LAB
ANION GAP SERPL CALCULATED.3IONS-SCNC: 10.7 MMOL/L (ref 5–15)
BASOPHILS # BLD AUTO: 0.03 10*3/MM3 (ref 0–0.2)
BASOPHILS NFR BLD AUTO: 0.3 % (ref 0–1.5)
BUN SERPL-MCNC: 11 MG/DL (ref 6–20)
BUN/CREAT SERPL: 21.2 (ref 7–25)
CALCIUM SPEC-SCNC: 9.5 MG/DL (ref 8.6–10.5)
CHLORIDE SERPL-SCNC: 100 MMOL/L (ref 98–107)
CO2 SERPL-SCNC: 22.3 MMOL/L (ref 22–29)
CREAT SERPL-MCNC: 0.52 MG/DL (ref 0.57–1)
DEPRECATED RDW RBC AUTO: 44.2 FL (ref 37–54)
EGFRCR SERPLBLD CKD-EPI 2021: 119.9 ML/MIN/1.73
EOSINOPHIL # BLD AUTO: 0.01 10*3/MM3 (ref 0–0.4)
EOSINOPHIL NFR BLD AUTO: 0.1 % (ref 0.3–6.2)
ERYTHROCYTE [DISTWIDTH] IN BLOOD BY AUTOMATED COUNT: 12.9 % (ref 12.3–15.4)
GLUCOSE SERPL-MCNC: 137 MG/DL (ref 65–99)
HCT VFR BLD AUTO: 38.7 % (ref 34–46.6)
HGB BLD-MCNC: 13.4 G/DL (ref 12–15.9)
IMM GRANULOCYTES # BLD AUTO: 0.04 10*3/MM3 (ref 0–0.05)
IMM GRANULOCYTES NFR BLD AUTO: 0.4 % (ref 0–0.5)
LYMPHOCYTES # BLD AUTO: 0.97 10*3/MM3 (ref 0.7–3.1)
LYMPHOCYTES NFR BLD AUTO: 9.9 % (ref 19.6–45.3)
MAGNESIUM SERPL-MCNC: 1.9 MG/DL (ref 1.6–2.6)
MCH RBC QN AUTO: 32.4 PG (ref 26.6–33)
MCHC RBC AUTO-ENTMCNC: 34.6 G/DL (ref 31.5–35.7)
MCV RBC AUTO: 93.5 FL (ref 79–97)
MONOCYTES # BLD AUTO: 0.26 10*3/MM3 (ref 0.1–0.9)
MONOCYTES NFR BLD AUTO: 2.6 % (ref 5–12)
NEUTROPHILS NFR BLD AUTO: 8.52 10*3/MM3 (ref 1.7–7)
NEUTROPHILS NFR BLD AUTO: 86.7 % (ref 42.7–76)
NRBC BLD AUTO-RTO: 0 /100 WBC (ref 0–0.2)
PLATELET # BLD AUTO: 367 10*3/MM3 (ref 140–450)
PMV BLD AUTO: 9.5 FL (ref 6–12)
POTASSIUM SERPL-SCNC: 4.2 MMOL/L (ref 3.5–5.2)
RBC # BLD AUTO: 4.14 10*6/MM3 (ref 3.77–5.28)
SODIUM SERPL-SCNC: 133 MMOL/L (ref 136–145)
WBC NRBC COR # BLD AUTO: 9.83 10*3/MM3 (ref 3.4–10.8)

## 2024-07-12 PROCEDURE — G0378 HOSPITAL OBSERVATION PER HR: HCPCS

## 2024-07-12 PROCEDURE — 80048 BASIC METABOLIC PNL TOTAL CA: CPT | Performed by: STUDENT IN AN ORGANIZED HEALTH CARE EDUCATION/TRAINING PROGRAM

## 2024-07-12 PROCEDURE — 85025 COMPLETE CBC W/AUTO DIFF WBC: CPT | Performed by: STUDENT IN AN ORGANIZED HEALTH CARE EDUCATION/TRAINING PROGRAM

## 2024-07-12 PROCEDURE — 83735 ASSAY OF MAGNESIUM: CPT | Performed by: STUDENT IN AN ORGANIZED HEALTH CARE EDUCATION/TRAINING PROGRAM

## 2024-07-12 RX ORDER — OXYCODONE HYDROCHLORIDE 5 MG/1
5 TABLET ORAL EVERY 4 HOURS PRN
Qty: 18 TABLET | Refills: 0 | Status: SHIPPED | OUTPATIENT
Start: 2024-07-12

## 2024-07-12 RX ORDER — NALOXONE HYDROCHLORIDE 4 MG/.1ML
1 SPRAY NASAL ONCE
Qty: 2 EACH | Refills: 0 | Status: SHIPPED | OUTPATIENT
Start: 2024-07-12 | End: 2024-07-16

## 2024-07-12 RX ORDER — BACLOFEN 10 MG/1
10 TABLET ORAL NIGHTLY
Status: DISCONTINUED | OUTPATIENT
Start: 2024-07-12 | End: 2024-07-12 | Stop reason: HOSPADM

## 2024-07-12 RX ORDER — AMOXICILLIN AND CLAVULANATE POTASSIUM 875; 125 MG/1; MG/1
1 TABLET, FILM COATED ORAL 2 TIMES DAILY
Qty: 14 TABLET | Refills: 0 | Status: SHIPPED | OUTPATIENT
Start: 2024-07-12 | End: 2024-07-22

## 2024-07-12 RX ADMIN — OXYCODONE 5 MG: 5 TABLET ORAL at 05:19

## 2024-07-12 RX ADMIN — AMLODIPINE BESYLATE 5 MG: 5 TABLET ORAL at 09:02

## 2024-07-12 RX ADMIN — HYDROCHLOROTHIAZIDE 25 MG: 25 TABLET ORAL at 09:02

## 2024-07-12 RX ADMIN — Medication 10 ML: at 09:02

## 2024-07-12 RX ADMIN — LISINOPRIL 20 MG: 20 TABLET ORAL at 09:02

## 2024-07-12 RX ADMIN — OXYCODONE 5 MG: 5 TABLET ORAL at 09:02

## 2024-07-12 NOTE — PLAN OF CARE
Problem: Adult Inpatient Plan of Care  Goal: Plan of Care Review  Outcome: Ongoing, Not Progressing  Flowsheets (Taken 7/11/2024 2021)  Progress: no change  Plan of Care Reviewed With:   patient   mother  Goal: Patient-Specific Goal (Individualized)  Outcome: Ongoing, Not Progressing  Goal: Absence of Hospital-Acquired Illness or Injury  Outcome: Ongoing, Not Progressing  Goal: Optimal Comfort and Wellbeing  Outcome: Ongoing, Not Progressing  Goal: Readiness for Transition of Care  Outcome: Ongoing, Not Progressing     Problem: Pain Acute  Goal: Acceptable Pain Control and Functional Ability  Outcome: Ongoing, Not Progressing     Problem: Surgical Site Infection  Goal: Absence of Infection Signs and Symptoms  Outcome: Ongoing, Not Progressing   Goal Outcome Evaluation:  Plan of Care Reviewed With: patient, mother        Progress: no change

## 2024-07-12 NOTE — PLAN OF CARE
Goal Outcome Evaluation:  Plan of Care Reviewed With: patient        Progress: improving  Outcome Evaluation: Pt continue to improve. Pt has c/o pain to rectal surgical site, but is well controlled with  medication. Pt wound is draining scant amount of clear fluid. Drain intact and draining well. Pt anticipated to dc home.

## 2024-07-12 NOTE — CASE MANAGEMENT/SOCIAL WORK
Continued Stay Note  [facility]     Patient Name: Kathi Mason  MRN: [id number]  Today's Date: 7/12/2024    Admit Date: 7/11/2024    Plan: Home at discharge.   Discharge Plan       Row Name 07/12/24 1001       Plan    Plan Home at discharge.    Patient/Family in Agreement with Plan yes    Plan Comments Patient will discharge home, possible dc today.             CALEB Lopez, RN, CCM    [address] [address]  Phone: [phone]  Fax: [phone]

## 2024-07-12 NOTE — DISCHARGE SUMMARY
[facility]    Allie Huertas YVETTE LÓPEZ    CHIEF COMPLAINT:     Pilonidal cyst    HISTORY OF PRESENT ILLNESS:    History of Present Illness  Obtained from ED provider HPI on 7/11/2024:  42 y/o -American female who has a history of CP and numerous pilonidal cyst with recent surgery 2 months ago performed by Dr. Espino and then Dr. Blanco as presents to the emergency room with complaint of drainage tube came out 3 days ago.  Patient reports that she presents to the emergency room because she has increased pain in her back region.  Patient states it is hard to sit due to pain and swelling.  Patient denies fever nausea or vomiting.     07/11/24:  Patient confirms the HPI noted above including a history of multiple pilonidal cyst which have required I&D's as well as drain placements in the past.  She notes that for the past several days she has had worsening pain and tenderness and has had persistent drainage since previous drainage approximately 1 month prior.  Some recent nausea and vomiting with the more intense pain has been noted but she denies any fever.     7/12/2024:  Patient reports she did well following procedure the previous day with moderate but controllable pain reported.  She was able to get some rest and notes she is anxious for discharge              Past Medical History:   Diagnosis Date    Cerebral palsy     Hypertension     PONV (postoperative nausea and vomiting)      Past Surgical History:   Procedure Laterality Date    ANKLE SURGERY      AXILLARY LYMPH NODE BIOPSY/EXCISION      EYE SURGERY      HIP SURGERY      LEG SURGERY Bilateral     MASS EXCISION N/A 7/14/2023    Procedure: PILONIDAL CYSTECTOMY, patient prone;  Surgeon: Gómez Espino MD;  Location: [facility] MAIN OR;  Service: General;  Laterality: N/A;    PILONIDAL CYSTECTOMY N/A 2/27/2024    Procedure: PILONIDAL CYSTECTOMY;  Surgeon: Brain Reid MD;  Location: [facility] MAIN OR;  Service: General;  Laterality: N/A;      Family History   Problem Relation Age of Onset    Hypertension Mother      Social History     Tobacco Use    Smoking status: Every Day     Current packs/day: 0.50     Types: Cigarettes     Passive exposure: Current    Smokeless tobacco: Never   Vaping Use    Vaping status: Never Used   Substance Use Topics    Alcohol use: Not Currently     Comment: occ    Drug use: Not Currently     Medications Prior to Admission   Medication Sig Dispense Refill Last Dose    amLODIPine (NORVASC) 5 MG tablet Take 1 tablet by mouth Daily.   7/10/2024    lisinopril-hydrochlorothiazide (PRINZIDE,ZESTORETIC) 20-25 MG per tablet Take 1 tablet by mouth Daily. HOLD 24 hours before surgery   7/10/2024    baclofen (LIORESAL) 10 MG tablet Take 1 tablet by mouth every night at bedtime.       Menthol-Zinc Oxide (Calmoseptine) 0.44-20.6 % ointment Apply 1 Application topically to the appropriate area as directed 2 (Two) Times a Day. 4 packet 0 Unknown     Allergies:  Latex      There is no immunization history on file for this patient.        REVIEW OF SYSTEMS:    Review of Systems   Constitutional: Negative.   HENT: Negative.     Eyes: Negative.    Cardiovascular: Negative.    Respiratory: Negative.     Skin:  Positive for poor wound healing.   Musculoskeletal: Negative.    Gastrointestinal: Negative.    Genitourinary: Negative.    Neurological: Negative.    Psychiatric/Behavioral: Negative.       Vital Signs  Temp:  [97.4 °F (36.3 °C)-98.6 °F (37 °C)] 98.3 °F (36.8 °C)  Heart Rate:  [72-98] 84  Resp:  [8-20] 15  BP: (109-156)/() 109/68          Physical Exam:  Physical Exam  Vitals reviewed.   Constitutional:       General: She is not in acute distress.     Appearance: Normal appearance. She is normal weight. She is not ill-appearing, toxic-appearing or diaphoretic.   HENT:      Head: Normocephalic.      Right Ear: External ear normal.      Left Ear: External ear normal.      Nose: Nose normal.      Mouth/Throat:      Mouth: Mucous  membranes are moist.   Eyes:      Extraocular Movements: Extraocular movements intact.   Cardiovascular:      Rate and Rhythm: Normal rate and regular rhythm.      Pulses: Normal pulses.   Pulmonary:      Effort: Pulmonary effort is normal.      Breath sounds: Normal breath sounds.   Abdominal:      General: Bowel sounds are normal.      Palpations: Abdomen is soft.   Musculoskeletal:         General: Swelling and tenderness present.      Cervical back: Normal range of motion.      Right lower leg: No edema.      Left lower leg: No edema.   Skin:     General: Skin is warm and dry.      Capillary Refill: Capillary refill takes less than 2 seconds.   Neurological:      General: No focal deficit present.      Mental Status: She is alert.   Psychiatric:         Mood and Affect: Mood normal.         Behavior: Behavior normal.         Thought Content: Thought content normal.         Judgment: Judgment normal.     Emotional Behavior:   Normal   Debilities:  None  Results Review:    I reviewed the patient's new clinical results.  Lab Results (most recent)       Procedure Component Value Units Date/Time    Comprehensive Metabolic Panel [[id number]]  (Abnormal) Collected: 07/11/24 0843    Specimen: Blood Updated: 07/11/24 0922     Glucose 112 mg/dL      BUN 8 mg/dL      Creatinine 0.55 mg/dL      Sodium 137 mmol/L      Potassium 3.9 mmol/L      Comment: Slight hemolysis detected by analyzer. Result may be falsely elevated.        Chloride 103 mmol/L      CO2 22.4 mmol/L      Calcium 9.9 mg/dL      Total Protein 8.6 g/dL      Albumin 4.5 g/dL      ALT (SGPT) 15 U/L      AST (SGOT) 20 U/L      Comment: Slight hemolysis detected by analyzer. Result may be falsely elevated.        Alkaline Phosphatase 89 U/L      Total Bilirubin 0.3 mg/dL      Globulin 4.1 gm/dL      A/G Ratio 1.1 g/dL      BUN/Creatinine Ratio 14.5     Anion Gap 11.6 mmol/L      eGFR 118.3 mL/min/1.73     Narrative:      GFR Normal >60  Chronic Kidney Disease  <60  Kidney Failure <15      hCG, Serum, Qualitative [[id number]]  (Normal) Collected: 07/11/24 0843    Specimen: Blood Updated: 07/11/24 0910     HCG Qualitative Negative    CBC & Differential [[id number]]  (Normal) Collected: 07/11/24 0843    Specimen: Blood Updated: 07/11/24 0854    Narrative:      The following orders were created for panel order CBC & Differential.  Procedure                               Abnormality         Status                     ---------                               -----------         ------                     CBC Auto Differential[[id number]]        Normal              Final result                 Please view results for these tests on the individual orders.    CBC Auto Differential [[id number]]  (Normal) Collected: 07/11/24 0843    Specimen: Blood Updated: 07/11/24 0854     WBC 8.20 10*3/mm3      RBC 4.40 10*6/mm3      Hemoglobin 14.1 g/dL      Hematocrit 41.1 %      MCV 93.4 fL      MCH 32.0 pg      MCHC 34.3 g/dL      RDW 12.7 %      RDW-SD 44.3 fl      MPV 9.2 fL      Platelets 358 10*3/mm3      Neutrophil % 68.4 %      Lymphocyte % 20.6 %      Monocyte % 6.6 %      Eosinophil % 3.5 %      Basophil % 0.7 %      Immature Grans % 0.2 %      Neutrophils, Absolute 5.60 10*3/mm3      Lymphocytes, Absolute 1.69 10*3/mm3      Monocytes, Absolute 0.54 10*3/mm3      Eosinophils, Absolute 0.29 10*3/mm3      Basophils, Absolute 0.06 10*3/mm3      Immature Grans, Absolute 0.02 10*3/mm3      nRBC 0.0 /100 WBC             Imaging Results (Most Recent)       Procedure Component Value Units Date/Time    CT Abdomen Pelvis With Contrast [[id number]] Collected: 07/11/24 0934     Updated: 07/11/24 0946    Narrative:      CT ABDOMEN PELVIS W CONTRAST    Date of Exam: 7/11/2024 9:28 AM EDT    Indication: [id number] pilonidal cysts.    Comparison: None available.    Technique: Axial CT images were obtained of the abdomen and pelvis following the uneventful intravenous administration of iodinated contrast.  Sagittal and coronal reconstructions were performed.  Automated exposure control and iterative reconstruction   methods were used.        Findings:  LUNG BASES:  Unremarkable without mass or infiltrate.    LIVER:  Unremarkable parenchyma without focal lesion.  BILIARY/GALLBLADDER:  Unremarkable  SPLEEN:  Unremarkable  PANCREAS:  Unremarkable  ADRENAL:  Unremarkable  KIDNEYS:  Unremarkable parenchyma with no solid mass identified. No obstruction.  No calculus identified.  GASTROINTESTINAL/MESENTERY:  No evidence of obstruction nor inflammation.    MESENTERIC VESSELS:  Patent.  AORTA/IVC:  Normal caliber.    RETROPERITONEUM/LYMPH NODES:  Unremarkable    REPRODUCTIVE: There is a 4.9 cm maximum diameter right ovarian cyst. There is the potential for adjacent hydrosalpinx. Follow-up pelvic ultrasound recommended in 6-12 weeks.  BLADDER:  Unremarkable    OSSEUS STRUCTURES:  Typical for age with no acute process identified.    Within the cephalad margin of the intergluteal crease there is a thick walled 2.1 cm subcutaneous cyst with subjacent soft tissue edema. Slightly more inferior there is what appears to be a more solid area of increased density potentially scar or   phlegmon which measures up to 1.5 cm in transverse dimension by 1.8 cm in AP dimension by 6.6 cm in CC dimension (image 100, series 5 and image 43, series 4). Correlate with exam.    More inferiorly within the deep left margin of the intergluteal crease near the perineum is a thin round likely metallic density within the skin and subcutaneous fat of uncertain significance, presumably related to intervention. Nonsurgical foreign body   would be a consideration. Correlate with history and symptoms. There is increased density of the surrounding subcutaneous fat without well delineated fluid collection, likely representing an element of scar/fibrosis related to the presence of this   object.        Impression:      Impression:  1.Thick-walled cyst and more  solid areas of increased density in the cephalad margin of the intergluteal crease as discussed above.  2.Nonspecific thin round area of increased density within the left intergluteal crease subcutaneous fat near the perineum presumably related to previous intervention. Correlate with symptoms and history.  3.There is a 4.9 cm right ovarian cyst with potential hydrosalpinx. Follow-up pelvic ultrasound in 6-12 weeks recommended.            Electronically Signed: Jeremy Moreno MD    7/11/2024 9:44 AM EDT    Workstation ID: [id number]          reviewed    ECG/EMG Results (most recent)       None          not reviewed            Microbiology Results (last 10 days)       ** No results found for the last 240 hours. **            Assessment & Plan     Intractable pain       Intractable pain with history of pilonidal cyst  -WBCs: 8.20  -Pregnancy test negative  -CT of abdomen and pelvis with contrast showed thick walled cyst and more solid areas of increased density in the cephalic margin of the inguinal crease with nonspecific thin round area of increased density within the left inguinal crease subcutaneous fat near the perineum presumably related to previous intervention.  A 4.9 cm right ovarian cyst with potential hydrosalpinx was also noted with recommendations for follow-up pelvic ultrasound in 6-12 weeks  -In the ED patient was given Toradol and morphine  -Colorectal surgeon consulted in ED cefazolin ordered with plans for I&D  -N.p.o.     Hypertension  -Poorly controlled       BP Readings from Last 1 Encounters:   07/11/24 156/97   - Continue amlodipine, lisinopril and hydrochlorothiazide  - Monitor while admitted    I discussed the patients findings and my recommendations with patient and nursing staff.     Discharge Diagnosis:      Intractable pain      Hospital Course  Patient is a 43 y.o. female presented with history of pilonidal cyst with continued drainage, increased swelling and pain and HPI noted above.  WBCs  within normal limits at 8.20 and CT of abdomen and pelvis was ordered in the ED which showed a thick walled cyst and more solid areas of increased density in the cephalic margin of the inguinal crease with nonspecific thin round area of increased density within the left inguinal crease subcutaneous fat near the perineum presumably related to previous intervention.  A 4.9 cm right ovarian cyst with potential hydrosalpinx was also noted with recommendations for follow-up pelvic ultrasound in 6-12 weeks.  She was given Toradol and morphine in the ED and colorectal surgery was consulted who evaluated patient and ordered IV cefazolin and recommended I&D in the OR which was performed on 7/11.  Surgeon recommended course of Augmentin as well as pain medications at discharge and notes that they are currently trying to arrange dermatology appointment for patient but are having some difficulty with coordinating this with patient's insurance needs locally.  At this time patient is felt to be in good condition for discharge with close follow-up with her PCP as well as surgeon on outpatient basis.  Her full testing/results and plan were discussed with patient along with concerning/alarm symptoms for which to call 911/return to the ED. Augmentin as well as hydrocodone will be prescribed at discharge.  All questions were answered and she verbalizes her understanding and agreement.    Past Medical History:     Past Medical History:   Diagnosis Date    Cerebral palsy     Hypertension     PONV (postoperative nausea and vomiting)        Past Surgical History:     Past Surgical History:   Procedure Laterality Date    ANKLE SURGERY      AXILLARY LYMPH NODE BIOPSY/EXCISION      EYE SURGERY      HIP SURGERY      LEG SURGERY Bilateral     MASS EXCISION N/A 7/14/2023    Procedure: PILONIDAL CYSTECTOMY, patient prone;  Surgeon: Gómez Espino MD;  Location: [facility] MAIN OR;  Service: General;  Laterality: N/A;    PILONIDAL CYSTECTOMY N/A  2/27/2024    Procedure: PILONIDAL CYSTECTOMY;  Surgeon: Brain Reid MD;  Location: [facility] MAIN OR;  Service: General;  Laterality: N/A;       Social History:   Social History     Socioeconomic History    Marital status: Single   Tobacco Use    Smoking status: Every Day     Current packs/day: 0.50     Types: Cigarettes     Passive exposure: Current    Smokeless tobacco: Never   Vaping Use    Vaping status: Never Used   Substance and Sexual Activity    Alcohol use: Not Currently     Comment: occ    Drug use: Not Currently    Sexual activity: Defer       Procedures Performed         Consults:   Consults       Date and Time Order Name Status Description    7/11/2024 10:18 AM Surgery (on-call MD unless specified) Completed             Condition on Discharge:     Stable    Discharge Disposition  Home or Self Care    Discharge Medications     Discharge Medications        New Medications        Instructions Start Date   amoxicillin-clavulanate 875-125 MG per tablet  Commonly known as: AUGMENTIN   1 tablet, Oral, 2 Times Daily      naloxone 4 MG/0.1ML nasal spray  Commonly known as: NARCAN   Call 911. Don't prime. Arnold in 1 nostril for overdose. Repeat in 2-3 minutes in other nostril if no or minimal breathing/responsiveness.      oxyCODONE 5 MG immediate release tablet  Commonly known as: ROXICODONE   5 mg, Oral, Every 4 Hours PRN             Continue These Medications        Instructions Start Date   amLODIPine 5 MG tablet  Commonly known as: NORVASC   5 mg, Oral, Daily      baclofen 10 MG tablet  Commonly known as: LIORESAL   10 mg, Oral, Every Night at Bedtime      Calmoseptine 0.44-20.6 % ointment  Generic drug: Menthol-Zinc Oxide   1 Application, Topical, 2 Times Daily      lisinopril-hydrochlorothiazide 20-25 MG per tablet  Commonly known as: PRINZIDE,ZESTORETIC   1 tablet, Oral, Daily, HOLD 24 hours before surgery                Discharge Diet:     Activity at Discharge:     Follow-up Appointments  Future  Appointments   Date Time Provider Department Center   7/26/2024 10:45 AM Brain Reid MD [facility]     Additional Instructions for the Follow-ups that You Need to Schedule       Discharge Follow-up with PCP   As directed       Currently Documented PCP:    Allie Huertas APRN    PCP Phone Number:    [phone]     Follow Up Details: 5 to 7 days        Discharge Follow-up with Specified Provider: Colorectal surgery; 2 Weeks   As directed      To: Colorectal surgery   Follow Up: 2 Weeks                Test Results Pending at Discharge       Risk for Readmission (LACE) Score: 1 (7/12/2024  6:00 AM)      Greater than 30 minutes spent in discharge activities for this patient    Signature:Electronically signed by Kamar Rene PA-C, 07/12/24, 10:20 AM EDT.

## 2024-07-12 NOTE — ANESTHESIA POSTPROCEDURE EVALUATION
Patient: Kathi Mason    Procedure Summary       Date: 07/11/24 Room / Location: [facility] OR 08 / [facility] MAIN OR    Anesthesia Start: 1839 Anesthesia Stop: 1923    Procedure: DRAINAGE OF PILONIDAL ABSCESS, SETON PLACEMENT (Back) Diagnosis:     Surgeons: Brain Reid MD Provider: Lopez Laureano MD    Anesthesia Type: general ASA Status: 3            Anesthesia Type: general    Vitals  Vitals Value Taken Time   /85 07/11/24 2005   Temp 97.8 °F (36.6 °C) 07/11/24 2005   Pulse 79 07/11/24 2006   Resp 8 07/11/24 2005   SpO2 99 % 07/11/24 2006   Vitals shown include unfiled device data.        Post Anesthesia Care and Evaluation    Patient location during evaluation: PACU  Patient participation: complete - patient participated  Level of consciousness: awake  Pain scale: See nurse's notes for pain score.  Pain management: adequate    Airway patency: patent  Anesthetic complications: No anesthetic complications  PONV Status: none  Cardiovascular status: acceptable  Respiratory status: acceptable and spontaneous ventilation  Hydration status: acceptable    Comments: Patient seen and examined postoperatively; vital signs stable; SpO2 greater than or equal to 90%; cardiopulmonary status stable; nausea/vomiting adequately controlled; pain adequately controlled; no apparent anesthesia complications; patient discharged from anesthesia care when discharge criteria were met

## 2024-07-14 NOTE — OP NOTE
CRS Operative Note   Name: Kathi Mason  : 1980  Date of Surgery: 2024  Pre-op Diagnosis: Anal abscess  Post-op Diagnosis: same  Procedure: Incision and drainage of abscess, seton drain placement,  Surgeon: Brain Reid MD   Assistants: N/A  Anesthesia: Local/MAC  IV Fluids: refer to anesthesia record  Estimated Blood Loss: minimal  Drains: seton drains  Implants: none  Specimen: none   Findings   - Pilonidal abscess  - severe hydradenitis in the perianal and perineum extending to the vaginal area. Multiple sinus tracts.   Complications   No complications  Indication  43 y.o. female with history of pilonidal abscess and hidradenitis.     Description of Procedure  Informed consent was obtained and the patient was taken to the OR. Anesthesia was administered. Ilya prominences were padded. The patient was place in the prone position. The anus and lower back was prepped and draped in sterile fashion.  A local anal block was performed.  On inspection there was induration and mild fluctuance in previous drain site. An incision was made at both old scar site which were at the superior portion of the gluteal cleft and to the left. Scant purulent fluid was evacuated. A seton drain was placed around the incisions. The seton was secured with 0 silk ties. Hemostasis was obtained.    There was no other fluctuant collections. Multiple sinus tracts of hydradenitis noted around perineum and perianal area without any collection.     Dressings were applied and the case was concluded.    Counts: Instrument, sponge, and needle counts were reported by the scrub nurse to be correct at the conclusion of the case.  Disposition  The patient was taken to Recovery Room in good condition.

## 2024-07-15 NOTE — CASE MANAGEMENT/SOCIAL WORK
Case Management Discharge Note      Final Note: Home     =    Transportation Services  Private: Car    Final Discharge Disposition Code: 01 - home or self-care

## 2024-07-26 ENCOUNTER — OFFICE VISIT (OUTPATIENT)
Age: 44
End: 2024-07-26
Payer: MEDICAID

## 2024-07-26 VITALS
TEMPERATURE: 98.2 F | SYSTOLIC BLOOD PRESSURE: 137 MMHG | HEART RATE: 87 BPM | BODY MASS INDEX: 21.97 KG/M2 | OXYGEN SATURATION: 98 % | DIASTOLIC BLOOD PRESSURE: 103 MMHG | HEIGHT: 63 IN

## 2024-07-26 DIAGNOSIS — L73.2 HIDRADENITIS SUPPURATIVA OF MULTIPLE SITES: ICD-10-CM

## 2024-07-26 DIAGNOSIS — K60.3 FISTULA-IN-ANO: Primary | ICD-10-CM

## 2024-07-26 PROCEDURE — 99024 POSTOP FOLLOW-UP VISIT: CPT | Performed by: STUDENT IN AN ORGANIZED HEALTH CARE EDUCATION/TRAINING PROGRAM

## 2024-07-26 PROCEDURE — 1160F RVW MEDS BY RX/DR IN RCRD: CPT | Performed by: STUDENT IN AN ORGANIZED HEALTH CARE EDUCATION/TRAINING PROGRAM

## 2024-07-26 PROCEDURE — 1159F MED LIST DOCD IN RCRD: CPT | Performed by: STUDENT IN AN ORGANIZED HEALTH CARE EDUCATION/TRAINING PROGRAM

## 2024-07-29 RX ORDER — ADALIMUMAB 40MG/0.4ML
40 KIT SUBCUTANEOUS WEEKLY
Qty: 2 EACH | Refills: 2 | Status: SHIPPED | OUTPATIENT
Start: 2024-07-29 | End: 2024-09-03

## 2024-07-29 NOTE — PROGRESS NOTES
Colorectal Surgery Followup Note    ID:  Kathi Mason;   : 1980  DATE OF VISIT: 2024    Chief Complaint  Follow-up (Pilonidal cyst is draining)       Subjective     Kathi is here for follow up. She has been doing well since her procedure. She reports her pain have resolved.  She reports minimal drainage.  Denies any fevers or chills.  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   -Follow-up in 6 to 8 weeks.         Brain Reid MD  Colon and Rectal Surgery   [facility]

## 2024-10-04 ENCOUNTER — TELEPHONE (OUTPATIENT)
Age: 44
End: 2024-10-04

## 2024-10-04 ENCOUNTER — OFFICE VISIT (OUTPATIENT)
Age: 44
End: 2024-10-04
Payer: MEDICAID

## 2024-10-04 VITALS
DIASTOLIC BLOOD PRESSURE: 102 MMHG | HEIGHT: 63 IN | TEMPERATURE: 98.6 F | WEIGHT: 123 LBS | SYSTOLIC BLOOD PRESSURE: 150 MMHG | HEART RATE: 79 BPM | OXYGEN SATURATION: 100 % | BODY MASS INDEX: 21.79 KG/M2

## 2024-10-04 DIAGNOSIS — L73.2 HIDRADENITIS SUPPURATIVA OF MULTIPLE SITES: Primary | ICD-10-CM

## 2024-10-04 DIAGNOSIS — L73.2 HIDRADENITIS SUPPURATIVA OF MULTIPLE SITES: ICD-10-CM

## 2024-10-04 RX ORDER — TRAMADOL HYDROCHLORIDE 50 MG/1
50 TABLET ORAL EVERY 6 HOURS PRN
Qty: 60 TABLET | Refills: 0 | Status: SHIPPED | OUTPATIENT
Start: 2024-10-04 | End: 2024-12-03

## 2024-10-04 RX ORDER — TRAMADOL HYDROCHLORIDE 50 MG/1
50 TABLET ORAL EVERY 6 HOURS PRN
Qty: 60 TABLET | Refills: 0 | Status: SHIPPED | OUTPATIENT
Start: 2024-10-04 | End: 2024-10-04 | Stop reason: SDUPTHER

## 2024-10-04 NOTE — PROGRESS NOTES
"Colorectal Surgery Followup Note    ID:  Kathi Mason;   : 1980  DATE OF VISIT: 10/4/2024    Chief Complaint  Follow-up (Follow up on Fistula )       Subjective     Kathi is here for follow up.  She reports multiple\" boils\" around her perineum, vagina, around anus and lower back.  She reports significant pain that has been limiting her.  Denies any fevers or chills.  She was not able to find a dermatologist or plastic surgeon.  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  She was not able to get a dermatologist to see her due to her insurance.  She is significantly struggling and debilitated from her symptoms  -We discussed possible Rinvoq however it is currently under clinical trials for hidradenitis with preliminary reports showing improvements of disease. I will try to arrange and get samples for trial.   -I will keep the seton in place for now.   -Follow-up if symptoms worsens        Brain Reid MD  Colon and Rectal Surgery   [facility]       "

## 2024-10-04 NOTE — TELEPHONE ENCOUNTER
Hub staff attempted to follow warm transfer process and was unsuccessful     Caller: Kathi Mason    Relationship to patient: Self    Best call back number: [phone] (home)       Patient is needing: [facility] STATED THEY HAVE A PAIN MEDICATION SHORTAGE AND ARE UNABLE TO FULFILL THE RX: TRAMADOL 50MG REQUEST.

## 2024-10-04 NOTE — TELEPHONE ENCOUNTER
Spoke with pt and she asked if this can be sent to the [facility] pharmacy on [address] due to [place] not having it in stock. It has been pended for  to sign off on.

## 2025-07-30 ENCOUNTER — TRANSCRIBE ORDERS (OUTPATIENT)
Dept: ADMINISTRATIVE | Facility: HOSPITAL | Age: 45
End: 2025-07-30
Payer: MEDICAID

## 2025-07-30 DIAGNOSIS — M25.521 RIGHT ELBOW PAIN: Primary | ICD-10-CM

## 2025-08-29 ENCOUNTER — HOSPITAL ENCOUNTER (OUTPATIENT)
Dept: MRI IMAGING | Facility: HOSPITAL | Age: 45
Discharge: HOME | End: 2025-08-29
Payer: MEDICAID

## 2025-08-29 DIAGNOSIS — M25.521 RIGHT ELBOW PAIN: ICD-10-CM

## 2025-08-29 PROCEDURE — 73221 MRI JOINT UPR EXTREM W/O DYE: CPT

## (undated) DEVICE — TP SKIN FABRC WATERPRF 2IN 5YD

## (undated) DEVICE — PAD,ABDOMINAL,5"X9",STERILE,LF,1/PK: Brand: MEDLINE INDUSTRIES, INC.

## (undated) DEVICE — DRSNG PAD ABD ABS 8X10IN STRL

## (undated) DEVICE — ANTIBACTERIAL UNDYED BRAIDED (POLYGLACTIN 910), SYNTHETIC ABSORBABLE SUTURE: Brand: COATED VICRYL

## (undated) DEVICE — PAD,ABDOMINAL,8"X10",ST,LF: Brand: MEDLINE

## (undated) DEVICE — SUT VIC 2/0 SH 27IN UD VCP417H

## (undated) DEVICE — PREP PVP-I 7.5P BT 4OZ

## (undated) DEVICE — CATH IV INSYTE AUTOGARD SHLD 16G 1 1/4

## (undated) DEVICE — CATH IV INSYTE AUTOGARD BLD/CONTRL SHLD 18G 1.25IN GRN

## (undated) DEVICE — PANT KNIT MATERN L/XL 2BG

## (undated) DEVICE — LUBE JELLY SURGILUBE TB/FLIPCAP 4.5OZ

## (undated) DEVICE — KT SURG TURNOVER 050

## (undated) DEVICE — PK MINOR LAPAROTOMY 50

## (undated) DEVICE — GLOVE,SURG,SENSICARE SLT,LF,PF,6.5: Brand: MEDLINE

## (undated) DEVICE — ADHS SKIN MASTISOL CAP 2OZ DISP

## (undated) DEVICE — PREP SOL POVIDONE/IODINE BT 4OZ

## (undated) DEVICE — PUNCH BIOP 5MM STRL

## (undated) DEVICE — GLV SURG SENSICARE SLT PF LF 6.5 STRL

## (undated) DEVICE — SOL IRR NACL 0.9PCT 1000ML

## (undated) DEVICE — SUT VIC 3/0 SH 27IN UD VCP416H

## (undated) DEVICE — PENCL HND ROCKRSWTCH HOLSTR EZ CLEAN TP CRD 10FT

## (undated) DEVICE — TBG PENCL TELESCP MEGADYNE SMOKE EVAC 15FT

## (undated) DEVICE — GAUZE,SPONGE,FLUFF,6"X6.75",STRL,5/TRAY: Brand: MEDLINE

## (undated) DEVICE — Device

## (undated) DEVICE — SUT SILK 0 TIES 30IN A306H

## (undated) DEVICE — LP VESL MAXI 2.5X1MM RED 2PK

## (undated) DEVICE — PENCL SMOKE/EVAC MEGADYNE TELESCP 15FT

## (undated) DEVICE — CVR HNDL LT SURG ACCSSRY BLU STRL

## (undated) DEVICE — CATH IV INSYTE AUTOGARD BLD/CONTRL SHLD 16G 1 1/4

## (undated) DEVICE — CATH IV INSYTE AUTOGARD SHLD 18G 1.25IN GRN

## (undated) DEVICE — CUFF SCD HEMOFORCE SEQ CALF STD MD

## (undated) DEVICE — ERBE NESSY®PLATE 170 SPLIT; 168CM²; CABLE 3M: Brand: ERBE

## (undated) DEVICE — SUT ETHLN 2/0 FS 18IN 664H

## (undated) DEVICE — SUT ETHLN 2/0 PS 18IN 585H

## (undated) DEVICE — PAD PLT GRND ELECTRD NESSY W/CORD DISP

## (undated) DEVICE — 3M™ DURAPORE™ SURGICAL TAPE 1538-3, 3 INCH X 10 YARD (7,5CM X 9,1M), 4 ROLLS/BOX: Brand: 3M™ DURAPORE™

## (undated) DEVICE — SUT VIC 4/0 FS2 27IN VCP422H